# Patient Record
Sex: FEMALE | Race: WHITE | NOT HISPANIC OR LATINO | Employment: OTHER | URBAN - NONMETROPOLITAN AREA
[De-identification: names, ages, dates, MRNs, and addresses within clinical notes are randomized per-mention and may not be internally consistent; named-entity substitution may affect disease eponyms.]

---

## 2017-01-01 ENCOUNTER — APPOINTMENT (OUTPATIENT)
Dept: PREADMISSION TESTING | Facility: HOSPITAL | Age: 72
End: 2017-01-01

## 2017-01-01 ENCOUNTER — OFFICE VISIT (OUTPATIENT)
Dept: ORTHOPEDIC SURGERY | Facility: CLINIC | Age: 72
End: 2017-01-01

## 2017-01-01 ENCOUNTER — OUTSIDE FACILITY SERVICE (OUTPATIENT)
Dept: FAMILY MEDICINE CLINIC | Facility: CLINIC | Age: 72
End: 2017-01-01

## 2017-01-01 ENCOUNTER — ANESTHESIA EVENT (OUTPATIENT)
Dept: PERIOP | Facility: HOSPITAL | Age: 72
End: 2017-01-01

## 2017-01-01 ENCOUNTER — TELEPHONE (OUTPATIENT)
Dept: FAMILY MEDICINE CLINIC | Facility: CLINIC | Age: 72
End: 2017-01-01

## 2017-01-01 ENCOUNTER — HOSPITAL ENCOUNTER (OUTPATIENT)
Facility: HOSPITAL | Age: 72
Setting detail: HOSPITAL OUTPATIENT SURGERY
Discharge: NURSING, REHAB OR LONG-TERM CARE | End: 2017-02-22
Attending: PODIATRIST | Admitting: PODIATRIST

## 2017-01-01 ENCOUNTER — APPOINTMENT (OUTPATIENT)
Dept: GENERAL RADIOLOGY | Facility: HOSPITAL | Age: 72
End: 2017-01-01

## 2017-01-01 ENCOUNTER — HOSPITAL ENCOUNTER (EMERGENCY)
Facility: HOSPITAL | Age: 72
Discharge: NURSING, REHAB OR LONG-TERM CARE | End: 2017-07-26
Attending: EMERGENCY MEDICINE | Admitting: EMERGENCY MEDICINE

## 2017-01-01 ENCOUNTER — TELEPHONE (OUTPATIENT)
Dept: ORTHOPEDIC SURGERY | Facility: CLINIC | Age: 72
End: 2017-01-01

## 2017-01-01 ENCOUNTER — HOSPITAL ENCOUNTER (EMERGENCY)
Facility: HOSPITAL | Age: 72
Discharge: ANOTHER HOSPITAL | End: 2017-11-14
Attending: EMERGENCY MEDICINE | Admitting: EMERGENCY MEDICINE

## 2017-01-01 ENCOUNTER — HOSPITAL ENCOUNTER (EMERGENCY)
Facility: HOSPITAL | Age: 72
Discharge: ANOTHER HOSPITAL | End: 2017-06-27
Attending: EMERGENCY MEDICINE | Admitting: EMERGENCY MEDICINE

## 2017-01-01 ENCOUNTER — LAB REQUISITION (OUTPATIENT)
Dept: LAB | Facility: HOSPITAL | Age: 72
End: 2017-01-01

## 2017-01-01 ENCOUNTER — HOSPITAL ENCOUNTER (OUTPATIENT)
Dept: GENERAL RADIOLOGY | Facility: HOSPITAL | Age: 72
Discharge: HOME | End: 2017-02-20
Admitting: PODIATRIST

## 2017-01-01 ENCOUNTER — HOSPITAL ENCOUNTER (OUTPATIENT)
Facility: HOSPITAL | Age: 72
Setting detail: HOSPITAL OUTPATIENT SURGERY
Discharge: NURSING, REHAB OR LONG-TERM CARE | End: 2017-06-05
Attending: PODIATRIST | Admitting: PODIATRIST

## 2017-01-01 ENCOUNTER — ANESTHESIA (OUTPATIENT)
Dept: PERIOP | Facility: HOSPITAL | Age: 72
End: 2017-01-01

## 2017-01-01 ENCOUNTER — HOSPITAL ENCOUNTER (INPATIENT)
Facility: HOSPITAL | Age: 72
LOS: 1 days | Discharge: ANOTHER HOSPITAL | End: 2017-04-19
Attending: PODIATRIST | Admitting: HOSPITALIST

## 2017-01-01 ENCOUNTER — TRANSCRIBE ORDERS (OUTPATIENT)
Dept: CT IMAGING | Facility: HOSPITAL | Age: 72
End: 2017-01-01

## 2017-01-01 ENCOUNTER — APPOINTMENT (OUTPATIENT)
Dept: CT IMAGING | Facility: HOSPITAL | Age: 72
End: 2017-01-01

## 2017-01-01 ENCOUNTER — HOSPITAL ENCOUNTER (EMERGENCY)
Facility: HOSPITAL | Age: 72
Discharge: ANOTHER HOSPITAL | End: 2017-12-02
Attending: EMERGENCY MEDICINE | Admitting: EMERGENCY MEDICINE

## 2017-01-01 VITALS
BODY MASS INDEX: 33.13 KG/M2 | WEIGHT: 180 LBS | HEIGHT: 62 IN | SYSTOLIC BLOOD PRESSURE: 143 MMHG | DIASTOLIC BLOOD PRESSURE: 59 MMHG

## 2017-01-01 VITALS — WEIGHT: 182.3 LBS | RESPIRATION RATE: 18 BRPM | HEIGHT: 62 IN | BODY MASS INDEX: 33.55 KG/M2

## 2017-01-01 VITALS
DIASTOLIC BLOOD PRESSURE: 58 MMHG | BODY MASS INDEX: 33.75 KG/M2 | HEIGHT: 62 IN | TEMPERATURE: 98.1 F | SYSTOLIC BLOOD PRESSURE: 109 MMHG | WEIGHT: 183.4 LBS | HEART RATE: 97 BPM | OXYGEN SATURATION: 94 % | RESPIRATION RATE: 20 BRPM

## 2017-01-01 VITALS
DIASTOLIC BLOOD PRESSURE: 69 MMHG | SYSTOLIC BLOOD PRESSURE: 125 MMHG | BODY MASS INDEX: 29.63 KG/M2 | HEART RATE: 108 BPM | OXYGEN SATURATION: 92 % | RESPIRATION RATE: 19 BRPM | WEIGHT: 161 LBS | HEIGHT: 62 IN | TEMPERATURE: 98.3 F

## 2017-01-01 VITALS — RESPIRATION RATE: 16 BRPM | HEIGHT: 62 IN | BODY MASS INDEX: 31.83 KG/M2 | WEIGHT: 173 LBS

## 2017-01-01 VITALS
TEMPERATURE: 98.7 F | BODY MASS INDEX: 29.63 KG/M2 | SYSTOLIC BLOOD PRESSURE: 129 MMHG | HEART RATE: 107 BPM | WEIGHT: 161 LBS | DIASTOLIC BLOOD PRESSURE: 65 MMHG | RESPIRATION RATE: 24 BRPM | HEIGHT: 62 IN | OXYGEN SATURATION: 96 %

## 2017-01-01 VITALS
TEMPERATURE: 98.6 F | RESPIRATION RATE: 18 BRPM | SYSTOLIC BLOOD PRESSURE: 134 MMHG | HEIGHT: 64 IN | WEIGHT: 180 LBS | HEART RATE: 105 BPM | BODY MASS INDEX: 30.73 KG/M2 | DIASTOLIC BLOOD PRESSURE: 58 MMHG | OXYGEN SATURATION: 95 %

## 2017-01-01 VITALS — BODY MASS INDEX: 32.43 KG/M2 | WEIGHT: 183 LBS | HEIGHT: 63 IN | RESPIRATION RATE: 16 BRPM

## 2017-01-01 VITALS — HEIGHT: 64 IN | BODY MASS INDEX: 31.07 KG/M2 | RESPIRATION RATE: 18 BRPM | WEIGHT: 182 LBS

## 2017-01-01 VITALS
WEIGHT: 180 LBS | OXYGEN SATURATION: 96 % | HEART RATE: 81 BPM | SYSTOLIC BLOOD PRESSURE: 138 MMHG | HEIGHT: 62 IN | RESPIRATION RATE: 20 BRPM | BODY MASS INDEX: 33.13 KG/M2 | TEMPERATURE: 98.1 F | DIASTOLIC BLOOD PRESSURE: 63 MMHG

## 2017-01-01 VITALS — RESPIRATION RATE: 18 BRPM | WEIGHT: 180 LBS | BODY MASS INDEX: 33.13 KG/M2 | HEIGHT: 62 IN

## 2017-01-01 VITALS — BODY MASS INDEX: 31.83 KG/M2 | WEIGHT: 173 LBS | RESPIRATION RATE: 16 BRPM | HEIGHT: 62 IN

## 2017-01-01 VITALS
HEART RATE: 66 BPM | DIASTOLIC BLOOD PRESSURE: 40 MMHG | RESPIRATION RATE: 17 BRPM | BODY MASS INDEX: 28.35 KG/M2 | WEIGHT: 160 LBS | TEMPERATURE: 98 F | OXYGEN SATURATION: 96 % | SYSTOLIC BLOOD PRESSURE: 90 MMHG | HEIGHT: 63 IN

## 2017-01-01 VITALS — BODY MASS INDEX: 32.42 KG/M2 | WEIGHT: 183 LBS

## 2017-01-01 DIAGNOSIS — S91.009A WOUND OF ANKLE, UNSPECIFIED LATERALITY, INITIAL ENCOUNTER: ICD-10-CM

## 2017-01-01 DIAGNOSIS — R79.89 OTHER SPECIFIED ABNORMAL FINDINGS OF BLOOD CHEMISTRY: ICD-10-CM

## 2017-01-01 DIAGNOSIS — S91.009A: ICD-10-CM

## 2017-01-01 DIAGNOSIS — R60.0 EDEMA OF EXTREMITY OF UNKNOWN CAUSE: ICD-10-CM

## 2017-01-01 DIAGNOSIS — N18.6 ESRD (END STAGE RENAL DISEASE) ON DIALYSIS (HCC): ICD-10-CM

## 2017-01-01 DIAGNOSIS — S91.302A OPEN WOUND OF LEFT FOOT EXCEPT TOES WITH COMPLICATION, INITIAL ENCOUNTER: ICD-10-CM

## 2017-01-01 DIAGNOSIS — I48.91 ATRIAL FIBRILLATION WITH RAPID VENTRICULAR RESPONSE (HCC): Primary | ICD-10-CM

## 2017-01-01 DIAGNOSIS — L97.923 LEG ULCER, LEFT, WITH NECROSIS OF MUSCLE (HCC): ICD-10-CM

## 2017-01-01 DIAGNOSIS — S91.302D WOUND, OPEN, FOOT, LEFT, SUBSEQUENT ENCOUNTER: Primary | ICD-10-CM

## 2017-01-01 DIAGNOSIS — Z99.2 ESRD (END STAGE RENAL DISEASE) ON DIALYSIS (HCC): ICD-10-CM

## 2017-01-01 DIAGNOSIS — S91.302D WOUND, OPEN, FOOT, LEFT, SUBSEQUENT ENCOUNTER: ICD-10-CM

## 2017-01-01 DIAGNOSIS — I48.91 ATRIAL FIBRILLATION, UNSPECIFIED TYPE (HCC): ICD-10-CM

## 2017-01-01 DIAGNOSIS — S91.309A: Primary | ICD-10-CM

## 2017-01-01 DIAGNOSIS — K59.00 CONSTIPATION, UNSPECIFIED CONSTIPATION TYPE: ICD-10-CM

## 2017-01-01 DIAGNOSIS — S91.309A: ICD-10-CM

## 2017-01-01 DIAGNOSIS — Z79.01 CHRONIC ANTICOAGULATION: ICD-10-CM

## 2017-01-01 DIAGNOSIS — L97.524 FOOT ULCER WITH NECROSIS OF BONE, LEFT (HCC): ICD-10-CM

## 2017-01-01 DIAGNOSIS — N30.00 ACUTE CYSTITIS WITHOUT HEMATURIA: Primary | ICD-10-CM

## 2017-01-01 DIAGNOSIS — R79.9 ABNORMAL FINDING OF BLOOD CHEMISTRY: ICD-10-CM

## 2017-01-01 DIAGNOSIS — L97.421: Primary | ICD-10-CM

## 2017-01-01 DIAGNOSIS — K92.1 BLOOD IN STOOL: ICD-10-CM

## 2017-01-01 DIAGNOSIS — E87.79 OTHER HYPERVOLEMIA: ICD-10-CM

## 2017-01-01 DIAGNOSIS — Z00.00 ROUTINE GENERAL MEDICAL EXAMINATION AT A HEALTH CARE FACILITY: ICD-10-CM

## 2017-01-01 DIAGNOSIS — N39.0 URINARY TRACT INFECTION, SITE UNSPECIFIED: Primary | ICD-10-CM

## 2017-01-01 DIAGNOSIS — I21.4 NSTEMI (NON-ST ELEVATED MYOCARDIAL INFARCTION) (HCC): ICD-10-CM

## 2017-01-01 DIAGNOSIS — R20.9 COLD LEFT FOOT: Primary | ICD-10-CM

## 2017-01-01 DIAGNOSIS — S91.009A WOUND OF ANKLE, UNSPECIFIED LATERALITY, INITIAL ENCOUNTER: Primary | ICD-10-CM

## 2017-01-01 DIAGNOSIS — L97.923 LEG ULCER, LEFT, WITH NECROSIS OF MUSCLE (HCC): Primary | ICD-10-CM

## 2017-01-01 DIAGNOSIS — E11.8 TYPE 2 DIABETES MELLITUS WITH COMPLICATION, UNSPECIFIED LONG TERM INSULIN USE STATUS: ICD-10-CM

## 2017-01-01 DIAGNOSIS — I21.4 NSTEMI (NON-ST ELEVATED MYOCARDIAL INFARCTION) (HCC): Primary | ICD-10-CM

## 2017-01-01 DIAGNOSIS — R60.0 EDEMA OF EXTREMITY OF UNKNOWN CAUSE: Primary | ICD-10-CM

## 2017-01-01 LAB
ACINETOBACTER SCREEN CX: NORMAL
ALBUMIN SERPL-MCNC: 2.4 G/DL (ref 3.5–5)
ALBUMIN SERPL-MCNC: 2.5 G/DL (ref 3.5–5)
ALBUMIN SERPL-MCNC: 2.5 G/DL (ref 3.5–5)
ALBUMIN SERPL-MCNC: 2.7 G/DL (ref 3.5–5)
ALBUMIN SERPL-MCNC: 3.3 G/DL (ref 3.5–5)
ALBUMIN SERPL-MCNC: 3.4 G/DL (ref 3.5–5)
ALBUMIN SERPL-MCNC: 3.8 G/DL (ref 3.5–5)
ALBUMIN SERPL-MCNC: 4 G/DL (ref 3.5–5)
ALBUMIN SERPL-MCNC: 4.2 G/DL (ref 3.5–5)
ALBUMIN/GLOB SERPL: 0.7 G/DL (ref 1–2)
ALBUMIN/GLOB SERPL: 0.9 G/DL (ref 1–2)
ALBUMIN/GLOB SERPL: 1 G/DL (ref 1–2)
ALBUMIN/GLOB SERPL: 1.2 G/DL (ref 1–2)
ALP SERPL-CCNC: 112 U/L (ref 38–126)
ALP SERPL-CCNC: 117 U/L (ref 38–126)
ALP SERPL-CCNC: 117 U/L (ref 38–126)
ALP SERPL-CCNC: 124 U/L (ref 38–126)
ALP SERPL-CCNC: 130 U/L (ref 38–126)
ALP SERPL-CCNC: 89 U/L (ref 38–126)
ALP SERPL-CCNC: 89 U/L (ref 38–126)
ALP SERPL-CCNC: 93 U/L (ref 38–126)
ALT SERPL W P-5'-P-CCNC: 150 U/L (ref 13–69)
ALT SERPL W P-5'-P-CCNC: 20 U/L (ref 13–69)
ALT SERPL W P-5'-P-CCNC: 26 U/L (ref 13–69)
ALT SERPL W P-5'-P-CCNC: 30 U/L (ref 13–69)
ALT SERPL W P-5'-P-CCNC: 32 U/L (ref 13–69)
ALT SERPL W P-5'-P-CCNC: 41 U/L (ref 13–69)
ALT SERPL W P-5'-P-CCNC: 53 U/L (ref 13–69)
ALT SERPL W P-5'-P-CCNC: 54 U/L (ref 13–69)
ANION GAP SERPL CALCULATED.3IONS-SCNC: 11.8 MMOL/L
ANION GAP SERPL CALCULATED.3IONS-SCNC: 12.1 MMOL/L
ANION GAP SERPL CALCULATED.3IONS-SCNC: 14.9 MMOL/L
ANION GAP SERPL CALCULATED.3IONS-SCNC: 15.3 MMOL/L
ANION GAP SERPL CALCULATED.3IONS-SCNC: 17.1 MMOL/L
ANION GAP SERPL CALCULATED.3IONS-SCNC: 17.5 MMOL/L
ANION GAP SERPL CALCULATED.3IONS-SCNC: 17.8 MMOL/L
ANION GAP SERPL CALCULATED.3IONS-SCNC: 19.1 MMOL/L
ANION GAP SERPL CALCULATED.3IONS-SCNC: 21 MMOL/L
ANISOCYTOSIS BLD QL: ABNORMAL
ANISOCYTOSIS BLD QL: NORMAL
APTT PPP: 24.8 SECONDS (ref 25–36)
AST SERPL-CCNC: 23 U/L (ref 15–46)
AST SERPL-CCNC: 26 U/L (ref 15–46)
AST SERPL-CCNC: 261 U/L (ref 15–46)
AST SERPL-CCNC: 30 U/L (ref 15–46)
AST SERPL-CCNC: 30 U/L (ref 15–46)
AST SERPL-CCNC: 42 U/L (ref 15–46)
AST SERPL-CCNC: 57 U/L (ref 15–46)
AST SERPL-CCNC: 69 U/L (ref 15–46)
BACTERIA SPEC AEROBE CULT: ABNORMAL
BACTERIA SPEC ANAEROBE CULT: NORMAL
BACTERIA SPEC ANAEROBE CULT: NORMAL
BACTERIA UR QL AUTO: ABNORMAL /HPF
BACTERIA UR QL AUTO: ABNORMAL /HPF
BASOPHILS # BLD AUTO: 0.01 10*3/MM3 (ref 0–0.2)
BASOPHILS # BLD AUTO: 0.02 10*3/MM3 (ref 0–0.2)
BASOPHILS # BLD AUTO: 0.03 10*3/MM3 (ref 0–0.2)
BASOPHILS # BLD AUTO: 0.04 10*3/MM3 (ref 0–0.2)
BASOPHILS # BLD AUTO: 0.06 10*3/MM3 (ref 0–0.2)
BASOPHILS NFR BLD AUTO: 0.1 % (ref 0–2.5)
BASOPHILS NFR BLD AUTO: 0.1 % (ref 0–2.5)
BASOPHILS NFR BLD AUTO: 0.2 % (ref 0–2.5)
BASOPHILS NFR BLD AUTO: 0.2 % (ref 0–2.5)
BASOPHILS NFR BLD AUTO: 0.3 % (ref 0–2.5)
BASOPHILS NFR BLD AUTO: 0.3 % (ref 0–2.5)
BASOPHILS NFR BLD AUTO: 0.6 % (ref 0–2.5)
BILIRUB SERPL-MCNC: 0.4 MG/DL (ref 0.2–1.3)
BILIRUB SERPL-MCNC: 0.5 MG/DL (ref 0.2–1.3)
BILIRUB SERPL-MCNC: 0.6 MG/DL (ref 0.2–1.3)
BILIRUB UR QL STRIP: ABNORMAL
BILIRUB UR QL STRIP: NEGATIVE
BUN BLD-MCNC: 20 MG/DL (ref 7–20)
BUN BLD-MCNC: 22 MG/DL (ref 7–20)
BUN BLD-MCNC: 26 MG/DL (ref 7–20)
BUN BLD-MCNC: 28 MG/DL (ref 7–20)
BUN BLD-MCNC: 29 MG/DL (ref 7–20)
BUN BLD-MCNC: 43 MG/DL (ref 7–20)
BUN BLD-MCNC: 47 MG/DL (ref 7–20)
BUN BLD-MCNC: 64 MG/DL (ref 7–20)
BUN BLD-MCNC: 80 MG/DL (ref 7–20)
BUN/CREAT SERPL: 10.2 (ref 7.1–23.5)
BUN/CREAT SERPL: 11.8 (ref 7.1–23.5)
BUN/CREAT SERPL: 14.9 (ref 7.1–23.5)
BUN/CREAT SERPL: 16.5 (ref 7.1–23.5)
BUN/CREAT SERPL: 32 (ref 7.1–23.5)
BUN/CREAT SERPL: 7.6 (ref 7.1–23.5)
BUN/CREAT SERPL: 7.7 (ref 7.1–23.5)
BUN/CREAT SERPL: 8.1 (ref 7.1–23.5)
BUN/CREAT SERPL: 8.5 (ref 7.1–23.5)
CALCIUM SPEC-SCNC: 7.2 MG/DL (ref 8.4–10.2)
CALCIUM SPEC-SCNC: 7.3 MG/DL (ref 8.4–10.2)
CALCIUM SPEC-SCNC: 7.6 MG/DL (ref 8.4–10.2)
CALCIUM SPEC-SCNC: 7.9 MG/DL (ref 8.4–10.2)
CALCIUM SPEC-SCNC: 8.3 MG/DL (ref 8.4–10.2)
CALCIUM SPEC-SCNC: 8.6 MG/DL (ref 8.4–10.2)
CALCIUM SPEC-SCNC: 8.7 MG/DL (ref 8.4–10.2)
CALCIUM SPEC-SCNC: 8.8 MG/DL (ref 8.4–10.2)
CALCIUM SPEC-SCNC: 9.4 MG/DL (ref 8.4–10.2)
CHLORIDE SERPL-SCNC: 100 MMOL/L (ref 98–107)
CHLORIDE SERPL-SCNC: 100 MMOL/L (ref 98–107)
CHLORIDE SERPL-SCNC: 105 MMOL/L (ref 98–107)
CHLORIDE SERPL-SCNC: 106 MMOL/L (ref 98–107)
CHLORIDE SERPL-SCNC: 108 MMOL/L (ref 98–107)
CHLORIDE SERPL-SCNC: 96 MMOL/L (ref 98–107)
CHLORIDE SERPL-SCNC: 97 MMOL/L (ref 98–107)
CHLORIDE SERPL-SCNC: 97 MMOL/L (ref 98–107)
CHLORIDE SERPL-SCNC: 98 MMOL/L (ref 98–107)
CHOLEST SERPL-MCNC: 80 MG/DL (ref 0–199)
CLARITY UR: ABNORMAL
CLARITY UR: ABNORMAL
CO2 SERPL-SCNC: 20 MMOL/L (ref 26–30)
CO2 SERPL-SCNC: 24 MMOL/L (ref 26–30)
CO2 SERPL-SCNC: 26 MMOL/L (ref 26–30)
CO2 SERPL-SCNC: 27 MMOL/L (ref 26–30)
CO2 SERPL-SCNC: 28 MMOL/L (ref 26–30)
CO2 SERPL-SCNC: 28 MMOL/L (ref 26–30)
CO2 SERPL-SCNC: 32 MMOL/L (ref 26–30)
COLOR UR: YELLOW
COLOR UR: YELLOW
CREAT BLD-MCNC: 2.2 MG/DL (ref 0.6–1.3)
CREAT BLD-MCNC: 2.5 MG/DL (ref 0.6–1.3)
CREAT BLD-MCNC: 2.6 MG/DL (ref 0.6–1.3)
CREAT BLD-MCNC: 2.6 MG/DL (ref 0.6–1.3)
CREAT BLD-MCNC: 2.9 MG/DL (ref 0.6–1.3)
CREAT BLD-MCNC: 3.3 MG/DL (ref 0.6–1.3)
CREAT BLD-MCNC: 3.6 MG/DL (ref 0.6–1.3)
CREAT BLD-MCNC: 4.3 MG/DL (ref 0.6–1.3)
CREAT BLD-MCNC: 4.6 MG/DL (ref 0.6–1.3)
CRP SERPL-MCNC: 6.9 MG/DL (ref 0–1)
DACRYOCYTES BLD QL SMEAR: NORMAL
DEPRECATED RDW RBC AUTO: 51.9 FL (ref 37–54)
DEPRECATED RDW RBC AUTO: 57.1 FL (ref 37–54)
DEPRECATED RDW RBC AUTO: 59.8 FL (ref 37–54)
DEPRECATED RDW RBC AUTO: 62.3 FL (ref 37–54)
DEPRECATED RDW RBC AUTO: 67.3 FL (ref 37–54)
DEPRECATED RDW RBC AUTO: 69.3 FL (ref 37–54)
DEPRECATED RDW RBC AUTO: 70.6 FL (ref 37–54)
DEPRECATED RDW RBC AUTO: 71.1 FL (ref 37–54)
ELLIPTOCYTES BLD QL SMEAR: NORMAL
EOSINOPHIL # BLD AUTO: 0.03 10*3/MM3 (ref 0–0.7)
EOSINOPHIL # BLD AUTO: 0.08 10*3/MM3 (ref 0–0.7)
EOSINOPHIL # BLD AUTO: 0.12 10*3/MM3 (ref 0–0.7)
EOSINOPHIL # BLD AUTO: 0.12 10*3/MM3 (ref 0–0.7)
EOSINOPHIL # BLD AUTO: 0.14 10*3/MM3 (ref 0–0.7)
EOSINOPHIL # BLD AUTO: 0.19 10*3/MM3 (ref 0–0.7)
EOSINOPHIL # BLD AUTO: 0.27 10*3/MM3 (ref 0–0.7)
EOSINOPHIL NFR BLD AUTO: 0.2 % (ref 0–7)
EOSINOPHIL NFR BLD AUTO: 0.7 % (ref 0–7)
EOSINOPHIL NFR BLD AUTO: 1 % (ref 0–7)
EOSINOPHIL NFR BLD AUTO: 1 % (ref 0–7)
EOSINOPHIL NFR BLD AUTO: 1.5 % (ref 0–7)
EOSINOPHIL NFR BLD AUTO: 1.5 % (ref 0–7)
EOSINOPHIL NFR BLD AUTO: 2.8 % (ref 0–7)
ERYTHROCYTE [DISTWIDTH] IN BLOOD BY AUTOMATED COUNT: 14.6 % (ref 11.5–14.5)
ERYTHROCYTE [DISTWIDTH] IN BLOOD BY AUTOMATED COUNT: 16.9 % (ref 11.5–14.5)
ERYTHROCYTE [DISTWIDTH] IN BLOOD BY AUTOMATED COUNT: 17 % (ref 11.5–14.5)
ERYTHROCYTE [DISTWIDTH] IN BLOOD BY AUTOMATED COUNT: 17.3 % (ref 11.5–14.5)
ERYTHROCYTE [DISTWIDTH] IN BLOOD BY AUTOMATED COUNT: 19.4 % (ref 11.5–14.5)
ERYTHROCYTE [DISTWIDTH] IN BLOOD BY AUTOMATED COUNT: 19.6 % (ref 11.5–14.5)
ERYTHROCYTE [DISTWIDTH] IN BLOOD BY AUTOMATED COUNT: 19.7 % (ref 11.5–14.5)
ERYTHROCYTE [DISTWIDTH] IN BLOOD BY AUTOMATED COUNT: 19.9 % (ref 11.5–14.5)
ERYTHROCYTE [SEDIMENTATION RATE] IN BLOOD: 27 MM/HR (ref 0–20)
GFR SERPL CREATININE-BSD FRML MDRD: 10 ML/MIN/1.73
GFR SERPL CREATININE-BSD FRML MDRD: 13 ML/MIN/1.73
GFR SERPL CREATININE-BSD FRML MDRD: 14 ML/MIN/1.73
GFR SERPL CREATININE-BSD FRML MDRD: 16 ML/MIN/1.73
GFR SERPL CREATININE-BSD FRML MDRD: 18 ML/MIN/1.73
GFR SERPL CREATININE-BSD FRML MDRD: 18 ML/MIN/1.73
GFR SERPL CREATININE-BSD FRML MDRD: 19 ML/MIN/1.73
GFR SERPL CREATININE-BSD FRML MDRD: 22 ML/MIN/1.73
GFR SERPL CREATININE-BSD FRML MDRD: 9 ML/MIN/1.73
GLOBULIN UR ELPH-MCNC: 3.4 GM/DL
GLOBULIN UR ELPH-MCNC: 3.5 GM/DL
GLOBULIN UR ELPH-MCNC: 3.6 GM/DL
GLOBULIN UR ELPH-MCNC: 3.7 GM/DL
GLOBULIN UR ELPH-MCNC: 3.8 GM/DL
GLOBULIN UR ELPH-MCNC: 3.8 GM/DL
GLOBULIN UR ELPH-MCNC: 3.9 GM/DL
GLOBULIN UR ELPH-MCNC: 4.5 GM/DL
GLUCOSE BLD-MCNC: 111 MG/DL (ref 74–98)
GLUCOSE BLD-MCNC: 115 MG/DL (ref 74–98)
GLUCOSE BLD-MCNC: 177 MG/DL (ref 74–98)
GLUCOSE BLD-MCNC: 191 MG/DL (ref 74–98)
GLUCOSE BLD-MCNC: 213 MG/DL (ref 74–98)
GLUCOSE BLD-MCNC: 245 MG/DL (ref 74–98)
GLUCOSE BLD-MCNC: 349 MG/DL (ref 74–98)
GLUCOSE BLD-MCNC: 543 MG/DL (ref 74–98)
GLUCOSE BLD-MCNC: 550 MG/DL (ref 74–98)
GLUCOSE BLDC GLUCOMTR-MCNC: 105 MG/DL (ref 70–130)
GLUCOSE BLDC GLUCOMTR-MCNC: 111 MG/DL (ref 70–130)
GLUCOSE BLDC GLUCOMTR-MCNC: 131 MG/DL (ref 70–130)
GLUCOSE BLDC GLUCOMTR-MCNC: 153 MG/DL (ref 70–130)
GLUCOSE BLDC GLUCOMTR-MCNC: 159 MG/DL (ref 70–130)
GLUCOSE BLDC GLUCOMTR-MCNC: 191 MG/DL (ref 70–130)
GLUCOSE BLDC GLUCOMTR-MCNC: 201 MG/DL (ref 70–130)
GLUCOSE BLDC GLUCOMTR-MCNC: 333 MG/DL (ref 70–130)
GLUCOSE BLDC GLUCOMTR-MCNC: 339 MG/DL (ref 70–130)
GLUCOSE BLDC GLUCOMTR-MCNC: 361 MG/DL (ref 70–130)
GLUCOSE BLDC GLUCOMTR-MCNC: 425 MG/DL (ref 70–130)
GLUCOSE BLDC GLUCOMTR-MCNC: 442 MG/DL (ref 70–130)
GLUCOSE BLDC GLUCOMTR-MCNC: 473 MG/DL (ref 70–130)
GLUCOSE BLDC GLUCOMTR-MCNC: 473 MG/DL (ref 70–130)
GLUCOSE BLDC GLUCOMTR-MCNC: 483 MG/DL (ref 70–130)
GLUCOSE BLDC GLUCOMTR-MCNC: 59 MG/DL (ref 70–130)
GLUCOSE UR STRIP-MCNC: NEGATIVE MG/DL
GLUCOSE UR STRIP-MCNC: NEGATIVE MG/DL
GRAM STN SPEC: NORMAL
HCT VFR BLD AUTO: 30 % (ref 37–47)
HCT VFR BLD AUTO: 32.1 % (ref 37–47)
HCT VFR BLD AUTO: 33 % (ref 37–47)
HCT VFR BLD AUTO: 33.1 % (ref 37–47)
HCT VFR BLD AUTO: 34.6 % (ref 37–47)
HCT VFR BLD AUTO: 36.2 % (ref 37–47)
HCT VFR BLD AUTO: 38.5 % (ref 37–47)
HCT VFR BLD AUTO: 41 % (ref 37–47)
HDLC SERPL-MCNC: 24 MG/DL (ref 40–60)
HGB BLD-MCNC: 10 G/DL (ref 12–16)
HGB BLD-MCNC: 10 G/DL (ref 12–16)
HGB BLD-MCNC: 10.6 G/DL (ref 12–16)
HGB BLD-MCNC: 11.2 G/DL (ref 12–16)
HGB BLD-MCNC: 11.9 G/DL (ref 12–16)
HGB BLD-MCNC: 8.5 G/DL (ref 12–16)
HGB BLD-MCNC: 9.5 G/DL (ref 12–16)
HGB BLD-MCNC: 9.5 G/DL (ref 12–16)
HGB UR QL STRIP.AUTO: ABNORMAL
HGB UR QL STRIP.AUTO: ABNORMAL
HOLD SPECIMEN: NORMAL
HOLD SPECIMEN: NORMAL
HYALINE CASTS UR QL AUTO: ABNORMAL /LPF
HYALINE CASTS UR QL AUTO: ABNORMAL /LPF
HYPOCHROMIA BLD QL: ABNORMAL
HYPOCHROMIA BLD QL: NORMAL
HYPOCHROMIA BLD QL: NORMAL
IMM GRANULOCYTES # BLD: 0.03 10*3/MM3 (ref 0–0.06)
IMM GRANULOCYTES # BLD: 0.04 10*3/MM3 (ref 0–0.06)
IMM GRANULOCYTES # BLD: 0.04 10*3/MM3 (ref 0–0.06)
IMM GRANULOCYTES # BLD: 0.05 10*3/MM3 (ref 0–0.06)
IMM GRANULOCYTES # BLD: 0.07 10*3/MM3 (ref 0–0.06)
IMM GRANULOCYTES # BLD: 0.17 10*3/MM3 (ref 0–0.06)
IMM GRANULOCYTES # BLD: 0.17 10*3/MM3 (ref 0–0.06)
IMM GRANULOCYTES NFR BLD: 0.3 % (ref 0–0.6)
IMM GRANULOCYTES NFR BLD: 0.3 % (ref 0–0.6)
IMM GRANULOCYTES NFR BLD: 0.4 % (ref 0–0.6)
IMM GRANULOCYTES NFR BLD: 0.5 % (ref 0–0.6)
IMM GRANULOCYTES NFR BLD: 0.5 % (ref 0–0.6)
IMM GRANULOCYTES NFR BLD: 1.2 % (ref 0–0.6)
IMM GRANULOCYTES NFR BLD: 1.3 % (ref 0–0.6)
INR PPP: 1.22
INR PPP: 1.32 (ref 0.9–1.1)
INR PPP: 1.63 (ref 0.9–1.1)
INR PPP: 2.67
INR PPP: 2.97 (ref 0.9–1.1)
INR PPP: 3.12 (ref 0.9–1.1)
INR PPP: 3.18 (ref 0.9–1.1)
INR PPP: 3.22 (ref 0.9–1.1)
INR PPP: 3.43 (ref 0.9–1.1)
INR PPP: 4.02 (ref 0.9–1.1)
KETONES UR QL STRIP: ABNORMAL
KETONES UR QL STRIP: ABNORMAL
LDLC SERPL CALC-MCNC: 34 MG/DL (ref 0–99)
LDLC/HDLC SERPL: 1.43 {RATIO}
LEUKOCYTE ESTERASE UR QL STRIP.AUTO: ABNORMAL
LEUKOCYTE ESTERASE UR QL STRIP.AUTO: ABNORMAL
LYMPHOCYTES # BLD AUTO: 0.84 10*3/MM3 (ref 0.6–3.4)
LYMPHOCYTES # BLD AUTO: 1.12 10*3/MM3 (ref 0.6–3.4)
LYMPHOCYTES # BLD AUTO: 1.24 10*3/MM3 (ref 0.6–3.4)
LYMPHOCYTES # BLD AUTO: 1.26 10*3/MM3 (ref 0.6–3.4)
LYMPHOCYTES # BLD AUTO: 1.29 10*3/MM3 (ref 0.6–3.4)
LYMPHOCYTES # BLD AUTO: 1.32 10*3/MM3 (ref 0.6–3.4)
LYMPHOCYTES # BLD AUTO: 1.71 10*3/MM3 (ref 0.6–3.4)
LYMPHOCYTES # BLD MANUAL: 1.11 10*3/MM3 (ref 0.6–3.4)
LYMPHOCYTES NFR BLD AUTO: 10.8 % (ref 10–50)
LYMPHOCYTES NFR BLD AUTO: 15.5 % (ref 10–50)
LYMPHOCYTES NFR BLD AUTO: 17.5 % (ref 10–50)
LYMPHOCYTES NFR BLD AUTO: 7 % (ref 10–50)
LYMPHOCYTES NFR BLD AUTO: 8.1 % (ref 10–50)
LYMPHOCYTES NFR BLD AUTO: 8.8 % (ref 10–50)
LYMPHOCYTES NFR BLD AUTO: 9 % (ref 10–50)
LYMPHOCYTES NFR BLD MANUAL: 10 % (ref 10–50)
LYMPHOCYTES NFR BLD MANUAL: 6 % (ref 0–12)
MACROCYTES BLD QL SMEAR: ABNORMAL
MAGNESIUM SERPL-MCNC: 2.1 MG/DL (ref 1.6–2.3)
MAGNESIUM SERPL-MCNC: 2.2 MG/DL (ref 1.6–2.3)
MCH RBC QN AUTO: 26.7 PG (ref 27–31)
MCH RBC QN AUTO: 27 PG (ref 27–31)
MCH RBC QN AUTO: 28.1 PG (ref 27–31)
MCH RBC QN AUTO: 28.6 PG (ref 27–31)
MCH RBC QN AUTO: 28.9 PG (ref 27–31)
MCH RBC QN AUTO: 29 PG (ref 27–31)
MCH RBC QN AUTO: 29 PG (ref 27–31)
MCH RBC QN AUTO: 29.5 PG (ref 27–31)
MCHC RBC AUTO-ENTMCNC: 28.3 G/DL (ref 30–37)
MCHC RBC AUTO-ENTMCNC: 28.8 G/DL (ref 30–37)
MCHC RBC AUTO-ENTMCNC: 28.9 G/DL (ref 30–37)
MCHC RBC AUTO-ENTMCNC: 29 G/DL (ref 30–37)
MCHC RBC AUTO-ENTMCNC: 29.1 G/DL (ref 30–37)
MCHC RBC AUTO-ENTMCNC: 29.3 G/DL (ref 30–37)
MCHC RBC AUTO-ENTMCNC: 29.6 G/DL (ref 30–37)
MCHC RBC AUTO-ENTMCNC: 30.2 G/DL (ref 30–37)
MCV RBC AUTO: 100 FL (ref 81–99)
MCV RBC AUTO: 100.6 FL (ref 81–99)
MCV RBC AUTO: 91.9 FL (ref 81–99)
MCV RBC AUTO: 95.2 FL (ref 81–99)
MCV RBC AUTO: 96.7 FL (ref 81–99)
MCV RBC AUTO: 97.6 FL (ref 81–99)
MCV RBC AUTO: 97.6 FL (ref 81–99)
MCV RBC AUTO: 97.9 FL (ref 81–99)
MICROCYTES BLD QL: NORMAL
MONOCYTES # BLD AUTO: 0.48 10*3/MM3 (ref 0–0.9)
MONOCYTES # BLD AUTO: 0.67 10*3/MM3 (ref 0–0.9)
MONOCYTES # BLD AUTO: 0.75 10*3/MM3 (ref 0–0.9)
MONOCYTES # BLD AUTO: 0.79 10*3/MM3 (ref 0–0.9)
MONOCYTES # BLD AUTO: 0.82 10*3/MM3 (ref 0–0.9)
MONOCYTES # BLD AUTO: 0.83 10*3/MM3 (ref 0–0.9)
MONOCYTES # BLD AUTO: 0.9 10*3/MM3 (ref 0–0.9)
MONOCYTES # BLD AUTO: 0.94 10*3/MM3 (ref 0–0.9)
MONOCYTES NFR BLD AUTO: 5.7 % (ref 0–12)
MONOCYTES NFR BLD AUTO: 5.9 % (ref 0–12)
MONOCYTES NFR BLD AUTO: 6.6 % (ref 0–12)
MONOCYTES NFR BLD AUTO: 7.9 % (ref 0–12)
MONOCYTES NFR BLD AUTO: 8.4 % (ref 0–12)
MRSA SPEC QL CULT: NORMAL
NEUTROPHILS # BLD AUTO: 10.13 10*3/MM3 (ref 2–6.9)
NEUTROPHILS # BLD AUTO: 10.41 10*3/MM3 (ref 2–6.9)
NEUTROPHILS # BLD AUTO: 12.17 10*3/MM3 (ref 2–6.9)
NEUTROPHILS # BLD AUTO: 13.06 10*3/MM3 (ref 2–6.9)
NEUTROPHILS # BLD AUTO: 6.22 10*3/MM3 (ref 2–6.9)
NEUTROPHILS # BLD AUTO: 6.88 10*3/MM3 (ref 2–6.9)
NEUTROPHILS # BLD AUTO: 9.32 10*3/MM3 (ref 2–6.9)
NEUTROPHILS # BLD AUTO: 9.57 10*3/MM3 (ref 2–6.9)
NEUTROPHILS NFR BLD AUTO: 70.2 % (ref 37–80)
NEUTROPHILS NFR BLD AUTO: 76.6 % (ref 37–80)
NEUTROPHILS NFR BLD AUTO: 80.1 % (ref 37–80)
NEUTROPHILS NFR BLD AUTO: 82.3 % (ref 37–80)
NEUTROPHILS NFR BLD AUTO: 82.8 % (ref 37–80)
NEUTROPHILS NFR BLD AUTO: 84.8 % (ref 37–80)
NEUTROPHILS NFR BLD AUTO: 85.2 % (ref 37–80)
NEUTROPHILS NFR BLD MANUAL: 69 % (ref 37–80)
NEUTS BAND NFR BLD MANUAL: 15 % (ref 0–6)
NITRITE UR QL STRIP: NEGATIVE
NITRITE UR QL STRIP: NEGATIVE
NRBC BLD MANUAL-RTO: 0 /100 WBC (ref 0–0)
NRBC BLD MANUAL-RTO: 0.2 /100 WBC (ref 0–0)
NRBC BLD MANUAL-RTO: 0.3 /100 WBC (ref 0–0)
NRBC BLD MANUAL-RTO: 0.9 /100 WBC (ref 0–0)
NRBC SPEC MANUAL: 2 /100 WBC (ref 0–0)
NT-PROBNP SERPL-MCNC: ABNORMAL PG/ML (ref 0–125)
PH UR STRIP.AUTO: <=5 [PH] (ref 5–8)
PH UR STRIP.AUTO: <=5 [PH] (ref 5–8)
PHOSPHATE SERPL-MCNC: 5.3 MG/DL (ref 2.5–4.5)
PLAT MORPH BLD: NORMAL
PLATELET # BLD AUTO: 120 10*3/MM3 (ref 130–400)
PLATELET # BLD AUTO: 124 10*3/MM3 (ref 130–400)
PLATELET # BLD AUTO: 159 10*3/MM3 (ref 130–400)
PLATELET # BLD AUTO: 171 10*3/MM3 (ref 130–400)
PLATELET # BLD AUTO: 182 10*3/MM3 (ref 130–400)
PLATELET # BLD AUTO: 196 10*3/MM3 (ref 130–400)
PLATELET # BLD AUTO: 202 10*3/MM3 (ref 130–400)
PLATELET # BLD AUTO: 231 10*3/MM3 (ref 130–400)
PMV BLD AUTO: 11.5 FL (ref 6–12)
PMV BLD AUTO: 11.6 FL (ref 6–12)
PMV BLD AUTO: 11.6 FL (ref 6–12)
PMV BLD AUTO: 11.7 FL (ref 6–12)
PMV BLD AUTO: 12.4 FL (ref 6–12)
PMV BLD AUTO: 12.6 FL (ref 6–12)
PMV BLD AUTO: 13.3 FL (ref 6–12)
PMV BLD AUTO: 13.7 FL (ref 6–12)
POIKILOCYTOSIS BLD QL SMEAR: ABNORMAL
POIKILOCYTOSIS BLD QL SMEAR: NORMAL
POIKILOCYTOSIS BLD QL SMEAR: NORMAL
POLYCHROMASIA BLD QL SMEAR: ABNORMAL
POLYCHROMASIA BLD QL SMEAR: NORMAL
POLYCHROMASIA BLD QL SMEAR: NORMAL
POTASSIUM BLD-SCNC: 3.8 MMOL/L (ref 3.5–5.1)
POTASSIUM BLD-SCNC: 3.9 MMOL/L (ref 3.5–5.1)
POTASSIUM BLD-SCNC: 4.1 MMOL/L (ref 3.5–5.1)
POTASSIUM BLD-SCNC: 4.1 MMOL/L (ref 3.5–5.1)
POTASSIUM BLD-SCNC: 4.3 MMOL/L (ref 3.5–5.1)
POTASSIUM BLD-SCNC: 4.5 MMOL/L (ref 3.5–5.1)
POTASSIUM BLD-SCNC: 4.8 MMOL/L (ref 3.5–5.1)
POTASSIUM BLD-SCNC: 5.1 MMOL/L (ref 3.5–5.1)
POTASSIUM BLD-SCNC: 6 MMOL/L (ref 3.5–5.1)
PROT SERPL-MCNC: 6.1 G/DL (ref 6.3–8.2)
PROT SERPL-MCNC: 6.3 G/DL (ref 6.3–8.2)
PROT SERPL-MCNC: 6.4 G/DL (ref 6.3–8.2)
PROT SERPL-MCNC: 6.8 G/DL (ref 6.3–8.2)
PROT SERPL-MCNC: 7.3 G/DL (ref 6.3–8.2)
PROT SERPL-MCNC: 7.4 G/DL (ref 6.3–8.2)
PROT SERPL-MCNC: 7.6 G/DL (ref 6.3–8.2)
PROT SERPL-MCNC: 8.7 G/DL (ref 6.3–8.2)
PROT UR QL STRIP: ABNORMAL
PROT UR QL STRIP: ABNORMAL
PROTHROMBIN TIME: 13.4 SECONDS (ref 9.6–11.5)
PROTHROMBIN TIME: 14.5 SECONDS (ref 9.3–12.1)
PROTHROMBIN TIME: 17.9 SECONDS (ref 9.3–12.1)
PROTHROMBIN TIME: 30 SECONDS (ref 9.6–11.5)
PROTHROMBIN TIME: 32.6 SECONDS (ref 9.3–12.1)
PROTHROMBIN TIME: 34.2 SECONDS (ref 9.3–12.1)
PROTHROMBIN TIME: 35.3 SECONDS (ref 9.3–12.1)
PROTHROMBIN TIME: 36.1 SECONDS (ref 9.3–12.1)
PROTHROMBIN TIME: 37.6 SECONDS (ref 9.3–12.1)
PROTHROMBIN TIME: 44.1 SECONDS (ref 9.3–12.1)
RBC # BLD AUTO: 3.15 10*6/MM3 (ref 4.2–5.4)
RBC # BLD AUTO: 3.28 10*6/MM3 (ref 4.2–5.4)
RBC # BLD AUTO: 3.28 10*6/MM3 (ref 4.2–5.4)
RBC # BLD AUTO: 3.39 10*6/MM3 (ref 4.2–5.4)
RBC # BLD AUTO: 3.46 10*6/MM3 (ref 4.2–5.4)
RBC # BLD AUTO: 3.71 10*6/MM3 (ref 4.2–5.4)
RBC # BLD AUTO: 3.98 10*6/MM3 (ref 4.2–5.4)
RBC # BLD AUTO: 4.46 10*6/MM3 (ref 4.2–5.4)
RBC # UR: ABNORMAL /HPF
RBC # UR: ABNORMAL /HPF
RBC MORPH BLD: NORMAL
RBC MORPH BLD: NORMAL
REF LAB TEST METHOD: ABNORMAL
REF LAB TEST METHOD: ABNORMAL
SMALL PLATELETS BLD QL SMEAR: ADEQUATE
SODIUM BLD-SCNC: 134 MMOL/L (ref 137–145)
SODIUM BLD-SCNC: 135 MMOL/L (ref 137–145)
SODIUM BLD-SCNC: 136 MMOL/L (ref 137–145)
SODIUM BLD-SCNC: 137 MMOL/L (ref 137–145)
SODIUM BLD-SCNC: 139 MMOL/L (ref 137–145)
SODIUM BLD-SCNC: 139 MMOL/L (ref 137–145)
SODIUM BLD-SCNC: 140 MMOL/L (ref 137–145)
SODIUM BLD-SCNC: 141 MMOL/L (ref 137–145)
SODIUM BLD-SCNC: 145 MMOL/L (ref 137–145)
SP GR UR STRIP: 1.02 (ref 1–1.03)
SP GR UR STRIP: >=1.03 (ref 1–1.03)
SQUAMOUS #/AREA URNS HPF: ABNORMAL /HPF
SQUAMOUS #/AREA URNS HPF: ABNORMAL /HPF
TRIGL SERPL-MCNC: 108 MG/DL
TROPONIN I SERPL-MCNC: 0.03 NG/ML (ref 0–0.03)
TROPONIN I SERPL-MCNC: 0.04 NG/ML (ref 0–0.05)
TROPONIN I SERPL-MCNC: 0.13 NG/ML (ref 0–0.03)
TROPONIN I SERPL-MCNC: 5.98 NG/ML (ref 0–0.03)
TROPONIN I SERPL-MCNC: 7.31 NG/ML (ref 0–0.03)
TROPONIN I SERPL-MCNC: 7.55 NG/ML (ref 0–0.03)
TROPONIN I SERPL-MCNC: 8.26 NG/ML (ref 0–0.03)
TSH SERPL DL<=0.05 MIU/L-ACNC: 2.88 MIU/ML (ref 0.47–4.68)
UROBILINOGEN UR QL STRIP: ABNORMAL
UROBILINOGEN UR QL STRIP: ABNORMAL
VLDLC SERPL-MCNC: 21.6 MG/DL
VRE SPEC QL CULT: NORMAL
WBC MORPH BLD: NORMAL
WBC NRBC COR # BLD: 11.09 10*3/MM3 (ref 4.8–10.8)
WBC NRBC COR # BLD: 11.94 10*3/MM3 (ref 4.8–10.8)
WBC NRBC COR # BLD: 11.95 10*3/MM3 (ref 4.8–10.8)
WBC NRBC COR # BLD: 12.66 10*3/MM3 (ref 4.8–10.8)
WBC NRBC COR # BLD: 14.67 10*3/MM3 (ref 4.8–10.8)
WBC NRBC COR # BLD: 15.32 10*3/MM3 (ref 4.8–10.8)
WBC NRBC COR # BLD: 8.12 10*3/MM3 (ref 4.8–10.8)
WBC NRBC COR # BLD: 9.79 10*3/MM3 (ref 4.8–10.8)
WBC UR QL AUTO: ABNORMAL /HPF
WBC UR QL AUTO: ABNORMAL /HPF
WHOLE BLOOD HOLD SPECIMEN: NORMAL
WHOLE BLOOD HOLD SPECIMEN: NORMAL
YEAST URNS QL MICRO: ABNORMAL /HPF

## 2017-01-01 PROCEDURE — 11045 DBRDMT SUBQ TISS EACH ADDL: CPT | Performed by: PODIATRIST

## 2017-01-01 PROCEDURE — 87147 CULTURE TYPE IMMUNOLOGIC: CPT

## 2017-01-01 PROCEDURE — 99233 SBSQ HOSP IP/OBS HIGH 50: CPT | Performed by: PODIATRIST

## 2017-01-01 PROCEDURE — 36591 DRAW BLOOD OFF VENOUS DEVICE: CPT

## 2017-01-01 PROCEDURE — 96365 THER/PROPH/DIAG IV INF INIT: CPT

## 2017-01-01 PROCEDURE — 99213 OFFICE O/P EST LOW 20 MIN: CPT | Performed by: PODIATRIST

## 2017-01-01 PROCEDURE — 99309 SBSQ NF CARE MODERATE MDM 30: CPT | Performed by: INTERNAL MEDICINE

## 2017-01-01 PROCEDURE — 80061 LIPID PANEL: CPT | Performed by: INTERNAL MEDICINE

## 2017-01-01 PROCEDURE — 82962 GLUCOSE BLOOD TEST: CPT

## 2017-01-01 PROCEDURE — 87086 URINE CULTURE/COLONY COUNT: CPT | Performed by: EMERGENCY MEDICINE

## 2017-01-01 PROCEDURE — 99239 HOSP IP/OBS DSCHRG MGMT >30: CPT | Performed by: HOSPITALIST

## 2017-01-01 PROCEDURE — C1894 INTRO/SHEATH, NON-LASER: HCPCS | Performed by: INTERNAL MEDICINE

## 2017-01-01 PROCEDURE — 85610 PROTHROMBIN TIME: CPT | Performed by: HOSPITALIST

## 2017-01-01 PROCEDURE — G0378 HOSPITAL OBSERVATION PER HR: HCPCS

## 2017-01-01 PROCEDURE — 85027 COMPLETE CBC AUTOMATED: CPT | Performed by: HOSPITALIST

## 2017-01-01 PROCEDURE — 80053 COMPREHEN METABOLIC PANEL: CPT | Performed by: PODIATRIST

## 2017-01-01 PROCEDURE — 99308 SBSQ NF CARE LOW MDM 20: CPT | Performed by: NURSE PRACTITIONER

## 2017-01-01 PROCEDURE — 99307 SBSQ NF CARE SF MDM 10: CPT | Performed by: INTERNAL MEDICINE

## 2017-01-01 PROCEDURE — 85025 COMPLETE CBC W/AUTO DIFF WBC: CPT | Performed by: PHYSICIAN ASSISTANT

## 2017-01-01 PROCEDURE — 4A023N7 MEASUREMENT OF CARDIAC SAMPLING AND PRESSURE, LEFT HEART, PERCUTANEOUS APPROACH: ICD-10-PCS | Performed by: INTERNAL MEDICINE

## 2017-01-01 PROCEDURE — 87077 CULTURE AEROBIC IDENTIFY: CPT | Performed by: PODIATRIST

## 2017-01-01 PROCEDURE — 93005 ELECTROCARDIOGRAM TRACING: CPT | Performed by: PODIATRIST

## 2017-01-01 PROCEDURE — C1769 GUIDE WIRE: HCPCS | Performed by: INTERNAL MEDICINE

## 2017-01-01 PROCEDURE — 25010000002 HEPARIN (PORCINE) PER 1000 UNITS: Performed by: INTERNAL MEDICINE

## 2017-01-01 PROCEDURE — 85025 COMPLETE CBC W/AUTO DIFF WBC: CPT | Performed by: EMERGENCY MEDICINE

## 2017-01-01 PROCEDURE — 85610 PROTHROMBIN TIME: CPT | Performed by: PHYSICIAN ASSISTANT

## 2017-01-01 PROCEDURE — 85025 COMPLETE CBC W/AUTO DIFF WBC: CPT | Performed by: PODIATRIST

## 2017-01-01 PROCEDURE — 80053 COMPREHEN METABOLIC PANEL: CPT | Performed by: EMERGENCY MEDICINE

## 2017-01-01 PROCEDURE — 87186 SC STD MICRODIL/AGAR DIL: CPT | Performed by: HOSPITALIST

## 2017-01-01 PROCEDURE — 84443 ASSAY THYROID STIM HORMONE: CPT | Performed by: HOSPITALIST

## 2017-01-01 PROCEDURE — 71010 HC CHEST PA OR AP: CPT

## 2017-01-01 PROCEDURE — 81001 URINALYSIS AUTO W/SCOPE: CPT | Performed by: PHYSICIAN ASSISTANT

## 2017-01-01 PROCEDURE — 25010000002 CEFTRIAXONE PER 250 MG: Performed by: EMERGENCY MEDICINE

## 2017-01-01 PROCEDURE — 87077 CULTURE AEROBIC IDENTIFY: CPT | Performed by: PHYSICIAN ASSISTANT

## 2017-01-01 PROCEDURE — 87205 SMEAR GRAM STAIN: CPT | Performed by: PODIATRIST

## 2017-01-01 PROCEDURE — 99285 EMERGENCY DEPT VISIT HI MDM: CPT

## 2017-01-01 PROCEDURE — P9612 CATHETERIZE FOR URINE SPEC: HCPCS

## 2017-01-01 PROCEDURE — 87077 CULTURE AEROBIC IDENTIFY: CPT

## 2017-01-01 PROCEDURE — 99308 SBSQ NF CARE LOW MDM 20: CPT | Performed by: INTERNAL MEDICINE

## 2017-01-01 PROCEDURE — 85610 PROTHROMBIN TIME: CPT | Performed by: INTERNAL MEDICINE

## 2017-01-01 PROCEDURE — 11043 DBRDMT MUSC&/FSCA 1ST 20/<: CPT | Performed by: PODIATRIST

## 2017-01-01 PROCEDURE — 87147 CULTURE TYPE IMMUNOLOGIC: CPT | Performed by: HOSPITALIST

## 2017-01-01 PROCEDURE — 85007 BL SMEAR W/DIFF WBC COUNT: CPT | Performed by: PHYSICIAN ASSISTANT

## 2017-01-01 PROCEDURE — 11042 DBRDMT SUBQ TIS 1ST 20SQCM/<: CPT | Performed by: PODIATRIST

## 2017-01-01 PROCEDURE — 63710000001 INSULIN REGULAR HUMAN PER 5 UNITS: Performed by: NURSE ANESTHETIST, CERTIFIED REGISTERED

## 2017-01-01 PROCEDURE — 84484 ASSAY OF TROPONIN QUANT: CPT | Performed by: HOSPITALIST

## 2017-01-01 PROCEDURE — 63710000001 INSULIN DETEMIR PER 5 UNITS: Performed by: HOSPITALIST

## 2017-01-01 PROCEDURE — 87070 CULTURE OTHR SPECIMN AEROBIC: CPT

## 2017-01-01 PROCEDURE — 86140 C-REACTIVE PROTEIN: CPT | Performed by: PHYSICIAN ASSISTANT

## 2017-01-01 PROCEDURE — 85007 BL SMEAR W/DIFF WBC COUNT: CPT | Performed by: PODIATRIST

## 2017-01-01 PROCEDURE — 99284 EMERGENCY DEPT VISIT MOD MDM: CPT

## 2017-01-01 PROCEDURE — 94660 CPAP INITIATION&MGMT: CPT

## 2017-01-01 PROCEDURE — 85651 RBC SED RATE NONAUTOMATED: CPT | Performed by: PHYSICIAN ASSISTANT

## 2017-01-01 PROCEDURE — 96361 HYDRATE IV INFUSION ADD-ON: CPT

## 2017-01-01 PROCEDURE — 99309 SBSQ NF CARE MODERATE MDM 30: CPT | Performed by: NURSE PRACTITIONER

## 2017-01-01 PROCEDURE — 85610 PROTHROMBIN TIME: CPT | Performed by: EMERGENCY MEDICINE

## 2017-01-01 PROCEDURE — 87077 CULTURE AEROBIC IDENTIFY: CPT | Performed by: HOSPITALIST

## 2017-01-01 PROCEDURE — 80069 RENAL FUNCTION PANEL: CPT | Performed by: INTERNAL MEDICINE

## 2017-01-01 PROCEDURE — 94799 UNLISTED PULMONARY SVC/PX: CPT

## 2017-01-01 PROCEDURE — 93459 L HRT ART/GRFT ANGIO: CPT | Performed by: INTERNAL MEDICINE

## 2017-01-01 PROCEDURE — 84484 ASSAY OF TROPONIN QUANT: CPT | Performed by: EMERGENCY MEDICINE

## 2017-01-01 PROCEDURE — 87077 CULTURE AEROBIC IDENTIFY: CPT | Performed by: EMERGENCY MEDICINE

## 2017-01-01 PROCEDURE — 87081 CULTURE SCREEN ONLY: CPT | Performed by: PODIATRIST

## 2017-01-01 PROCEDURE — 87186 SC STD MICRODIL/AGAR DIL: CPT

## 2017-01-01 PROCEDURE — 29580 STRAPPING UNNA BOOT: CPT | Performed by: PODIATRIST

## 2017-01-01 PROCEDURE — 15276 SKIN SUB GRAFT F/N/HF/G ADDL: CPT | Performed by: PODIATRIST

## 2017-01-01 PROCEDURE — 85610 PROTHROMBIN TIME: CPT

## 2017-01-01 PROCEDURE — 85007 BL SMEAR W/DIFF WBC COUNT: CPT | Performed by: EMERGENCY MEDICINE

## 2017-01-01 PROCEDURE — 25010000002 DEXAMETHASONE SODIUM PHOSPHATE 10 MG/ML SOLUTION

## 2017-01-01 PROCEDURE — 90935 HEMODIALYSIS ONE EVALUATION: CPT

## 2017-01-01 PROCEDURE — 99214 OFFICE O/P EST MOD 30 MIN: CPT | Performed by: PODIATRIST

## 2017-01-01 PROCEDURE — 80053 COMPREHEN METABOLIC PANEL: CPT | Performed by: HOSPITALIST

## 2017-01-01 PROCEDURE — 85007 BL SMEAR W/DIFF WBC COUNT: CPT | Performed by: INTERNAL MEDICINE

## 2017-01-01 PROCEDURE — 63710000001 INSULIN ASPART PER 5 UNITS: Performed by: HOSPITALIST

## 2017-01-01 PROCEDURE — 80053 COMPREHEN METABOLIC PANEL: CPT | Performed by: PHYSICIAN ASSISTANT

## 2017-01-01 PROCEDURE — 85610 PROTHROMBIN TIME: CPT | Performed by: PODIATRIST

## 2017-01-01 PROCEDURE — 84484 ASSAY OF TROPONIN QUANT: CPT | Performed by: INTERNAL MEDICINE

## 2017-01-01 PROCEDURE — 87186 SC STD MICRODIL/AGAR DIL: CPT | Performed by: PODIATRIST

## 2017-01-01 PROCEDURE — 87186 SC STD MICRODIL/AGAR DIL: CPT | Performed by: PHYSICIAN ASSISTANT

## 2017-01-01 PROCEDURE — 96374 THER/PROPH/DIAG INJ IV PUSH: CPT

## 2017-01-01 PROCEDURE — 81001 URINALYSIS AUTO W/SCOPE: CPT | Performed by: EMERGENCY MEDICINE

## 2017-01-01 PROCEDURE — 85610 PROTHROMBIN TIME: CPT | Performed by: NURSE ANESTHETIST, CERTIFIED REGISTERED

## 2017-01-01 PROCEDURE — 93005 ELECTROCARDIOGRAM TRACING: CPT | Performed by: EMERGENCY MEDICINE

## 2017-01-01 PROCEDURE — 71020 HC CHEST PA AND LATERAL: CPT

## 2017-01-01 PROCEDURE — 85025 COMPLETE CBC W/AUTO DIFF WBC: CPT | Performed by: INTERNAL MEDICINE

## 2017-01-01 PROCEDURE — 81003 URINALYSIS AUTO W/O SCOPE: CPT | Performed by: PHYSICIAN ASSISTANT

## 2017-01-01 PROCEDURE — 99222 1ST HOSP IP/OBS MODERATE 55: CPT | Performed by: HOSPITALIST

## 2017-01-01 PROCEDURE — 73630 X-RAY EXAM OF FOOT: CPT

## 2017-01-01 PROCEDURE — 83735 ASSAY OF MAGNESIUM: CPT | Performed by: HOSPITALIST

## 2017-01-01 PROCEDURE — 87086 URINE CULTURE/COLONY COUNT: CPT | Performed by: PHYSICIAN ASSISTANT

## 2017-01-01 PROCEDURE — 25010000002 MIDAZOLAM PER 1 MG

## 2017-01-01 PROCEDURE — 93005 ELECTROCARDIOGRAM TRACING: CPT | Performed by: INTERNAL MEDICINE

## 2017-01-01 PROCEDURE — B2151ZZ FLUOROSCOPY OF LEFT HEART USING LOW OSMOLAR CONTRAST: ICD-10-PCS | Performed by: INTERNAL MEDICINE

## 2017-01-01 PROCEDURE — 85730 THROMBOPLASTIN TIME PARTIAL: CPT | Performed by: PHYSICIAN ASSISTANT

## 2017-01-01 PROCEDURE — 15275 SKIN SUB GRAFT FACE/NK/HF/G: CPT | Performed by: PODIATRIST

## 2017-01-01 PROCEDURE — 99232 SBSQ HOSP IP/OBS MODERATE 35: CPT | Performed by: HOSPITALIST

## 2017-01-01 PROCEDURE — 87205 SMEAR GRAM STAIN: CPT

## 2017-01-01 PROCEDURE — 74176 CT ABD & PELVIS W/O CONTRAST: CPT

## 2017-01-01 PROCEDURE — 87070 CULTURE OTHR SPECIMN AEROBIC: CPT | Performed by: PODIATRIST

## 2017-01-01 PROCEDURE — 87186 SC STD MICRODIL/AGAR DIL: CPT | Performed by: EMERGENCY MEDICINE

## 2017-01-01 PROCEDURE — 87075 CULTR BACTERIA EXCEPT BLOOD: CPT | Performed by: PODIATRIST

## 2017-01-01 PROCEDURE — 99222 1ST HOSP IP/OBS MODERATE 55: CPT | Performed by: PODIATRIST

## 2017-01-01 PROCEDURE — 99204 OFFICE O/P NEW MOD 45 MIN: CPT | Performed by: PODIATRIST

## 2017-01-01 PROCEDURE — 83880 ASSAY OF NATRIURETIC PEPTIDE: CPT | Performed by: EMERGENCY MEDICINE

## 2017-01-01 PROCEDURE — 87075 CULTR BACTERIA EXCEPT BLOOD: CPT

## 2017-01-01 PROCEDURE — 0 IOPAMIDOL PER 1 ML: Performed by: INTERNAL MEDICINE

## 2017-01-01 PROCEDURE — 25010000002 CEFTRIAXONE: Performed by: PHYSICIAN ASSISTANT

## 2017-01-01 PROCEDURE — 36415 COLL VENOUS BLD VENIPUNCTURE: CPT

## 2017-01-01 PROCEDURE — 87070 CULTURE OTHR SPECIMN AEROBIC: CPT | Performed by: HOSPITALIST

## 2017-01-01 PROCEDURE — 96366 THER/PROPH/DIAG IV INF ADDON: CPT

## 2017-01-01 PROCEDURE — B2111ZZ FLUOROSCOPY OF MULTIPLE CORONARY ARTERIES USING LOW OSMOLAR CONTRAST: ICD-10-PCS | Performed by: INTERNAL MEDICINE

## 2017-01-01 PROCEDURE — 85007 BL SMEAR W/DIFF WBC COUNT: CPT | Performed by: HOSPITALIST

## 2017-01-01 PROCEDURE — 25010000002 ONDANSETRON PER 1 MG: Performed by: INTERNAL MEDICINE

## 2017-01-01 DEVICE — ALLOGRFT AMNIO AMNIOFIX 4X6CM: Type: IMPLANTABLE DEVICE | Site: FOOT | Status: FUNCTIONAL

## 2017-01-01 DEVICE — ALLOGRFT AMNIO AMNIOFIX 4X4CM: Type: IMPLANTABLE DEVICE | Site: LEG | Status: FUNCTIONAL

## 2017-01-01 RX ORDER — ATORVASTATIN CALCIUM 40 MG/1
TABLET, FILM COATED ORAL
COMMUNITY
Start: 2017-01-01 | End: 2017-01-01 | Stop reason: SDUPTHER

## 2017-01-01 RX ORDER — SODIUM CHLORIDE 0.9 % (FLUSH) 0.9 %
10 SYRINGE (ML) INJECTION AS NEEDED
Status: DISCONTINUED | OUTPATIENT
Start: 2017-01-01 | End: 2017-01-01 | Stop reason: HOSPADM

## 2017-01-01 RX ORDER — ACETAMINOPHEN AND CODEINE PHOSPHATE 300; 30 MG/1; MG/1
1 TABLET ORAL EVERY 4 HOURS PRN
COMMUNITY
End: 2017-01-01

## 2017-01-01 RX ORDER — CALCIUM CITRATE/VITAMIN D3 200MG-6.25
1 TABLET ORAL AS NEEDED
Refills: 0 | COMMUNITY
Start: 2016-10-21

## 2017-01-01 RX ORDER — BISACODYL 5 MG/1
5 TABLET, DELAYED RELEASE ORAL DAILY PRN
COMMUNITY

## 2017-01-01 RX ORDER — AMOXICILLIN AND CLAVULANATE POTASSIUM 500; 125 MG/1; MG/1
1 TABLET, FILM COATED ORAL EVERY 12 HOURS SCHEDULED
Qty: 8 TABLET | Refills: 0 | Status: SHIPPED | OUTPATIENT
Start: 2017-01-01 | End: 2017-01-01

## 2017-01-01 RX ORDER — COLLAGENASE SANTYL 250 [ARB'U]/G
OINTMENT TOPICAL
COMMUNITY
Start: 2017-01-01

## 2017-01-01 RX ORDER — AMIODARONE HYDROCHLORIDE 200 MG/1
100 TABLET ORAL DAILY
COMMUNITY
End: 2017-01-01

## 2017-01-01 RX ORDER — LISINOPRIL 5 MG/1
5 TABLET ORAL DAILY
COMMUNITY

## 2017-01-01 RX ORDER — LIDOCAINE HYDROCHLORIDE 10 MG/ML
INJECTION, SOLUTION INFILTRATION; PERINEURAL
Status: DISCONTINUED
Start: 2017-01-01 | End: 2017-01-01 | Stop reason: HOSPADM

## 2017-01-01 RX ORDER — METOPROLOL SUCCINATE 25 MG/1
25 TABLET, EXTENDED RELEASE ORAL EVERY 12 HOURS SCHEDULED
Status: DISCONTINUED | OUTPATIENT
Start: 2017-01-01 | End: 2017-01-01 | Stop reason: HOSPADM

## 2017-01-01 RX ORDER — ONDANSETRON 2 MG/ML
4 INJECTION INTRAMUSCULAR; INTRAVENOUS EVERY 6 HOURS PRN
Status: DISCONTINUED | OUTPATIENT
Start: 2017-01-01 | End: 2017-01-01 | Stop reason: HOSPADM

## 2017-01-01 RX ORDER — CLONAZEPAM 1 MG/1
0.5 TABLET ORAL NIGHTLY PRN
COMMUNITY
End: 2017-01-01

## 2017-01-01 RX ORDER — SACCHAROMYCES BOULARDII 250 MG
250 CAPSULE ORAL 2 TIMES DAILY
COMMUNITY

## 2017-01-01 RX ORDER — TEMAZEPAM 7.5 MG/1
7.5 CAPSULE ORAL NIGHTLY PRN
COMMUNITY

## 2017-01-01 RX ORDER — AMIODARONE HYDROCHLORIDE 200 MG/1
100 TABLET ORAL DAILY
Status: DISCONTINUED | OUTPATIENT
Start: 2017-01-01 | End: 2017-01-01 | Stop reason: HOSPADM

## 2017-01-01 RX ORDER — LOPERAMIDE HYDROCHLORIDE 2 MG/1
2 CAPSULE ORAL 4 TIMES DAILY PRN
COMMUNITY

## 2017-01-01 RX ORDER — ACETAMINOPHEN AND CODEINE PHOSPHATE 300; 30 MG/1; MG/1
TABLET ORAL
COMMUNITY
Start: 2017-01-01

## 2017-01-01 RX ORDER — BACITRACIN 50000 [IU]/1
INJECTION, POWDER, FOR SOLUTION INTRAMUSCULAR
Status: DISCONTINUED
Start: 2017-01-01 | End: 2017-01-01 | Stop reason: HOSPADM

## 2017-01-01 RX ORDER — BUPIVACAINE HYDROCHLORIDE 5 MG/ML
INJECTION, SOLUTION EPIDURAL; INTRACAUDAL
Status: DISPENSED
Start: 2017-01-01 | End: 2017-01-01

## 2017-01-01 RX ORDER — SODIUM CHLORIDE 0.9 % (FLUSH) 0.9 %
1-10 SYRINGE (ML) INJECTION AS NEEDED
Status: DISCONTINUED | OUTPATIENT
Start: 2017-01-01 | End: 2017-01-01 | Stop reason: HOSPADM

## 2017-01-01 RX ORDER — DOXYCYCLINE 100 MG/1
100 CAPSULE ORAL EVERY 12 HOURS SCHEDULED
Status: DISCONTINUED | OUTPATIENT
Start: 2017-01-01 | End: 2017-01-01 | Stop reason: HOSPADM

## 2017-01-01 RX ORDER — HEPARIN SODIUM 5000 [USP'U]/ML
5000 INJECTION, SOLUTION INTRAVENOUS; SUBCUTANEOUS EVERY 12 HOURS SCHEDULED
Status: DISCONTINUED | OUTPATIENT
Start: 2017-01-01 | End: 2017-01-01 | Stop reason: SDUPTHER

## 2017-01-01 RX ORDER — DOXYCYCLINE 100 MG/1
100 CAPSULE ORAL EVERY 12 HOURS SCHEDULED
Qty: 10 CAPSULE | Refills: 0 | Status: SHIPPED | OUTPATIENT
Start: 2017-01-01 | End: 2017-01-01

## 2017-01-01 RX ORDER — CIPROFLOXACIN 500 MG/1
500 TABLET, FILM COATED ORAL 2 TIMES DAILY
Qty: 10 TABLET | Refills: 1 | Status: SHIPPED | OUTPATIENT
Start: 2017-01-01 | End: 2017-01-01 | Stop reason: HOSPADM

## 2017-01-01 RX ORDER — METOPROLOL SUCCINATE 25 MG/1
12.5 TABLET, EXTENDED RELEASE ORAL EVERY 12 HOURS SCHEDULED
Status: DISCONTINUED | OUTPATIENT
Start: 2017-01-01 | End: 2017-01-01

## 2017-01-01 RX ORDER — DOCUSATE SODIUM 100 MG/1
100 CAPSULE, LIQUID FILLED ORAL 2 TIMES DAILY
COMMUNITY

## 2017-01-01 RX ORDER — CLINDAMYCIN HYDROCHLORIDE 300 MG/1
300 CAPSULE ORAL 3 TIMES DAILY
Qty: 20 CAPSULE | Refills: 1 | Status: SHIPPED | OUTPATIENT
Start: 2017-01-01 | End: 2017-01-01 | Stop reason: SDUPTHER

## 2017-01-01 RX ORDER — SODIUM CHLORIDE, SODIUM LACTATE, POTASSIUM CHLORIDE, CALCIUM CHLORIDE 600; 310; 30; 20 MG/100ML; MG/100ML; MG/100ML; MG/100ML
100 INJECTION, SOLUTION INTRAVENOUS CONTINUOUS
Status: DISCONTINUED | OUTPATIENT
Start: 2017-01-01 | End: 2017-01-01

## 2017-01-01 RX ORDER — ASPIRIN 325 MG
325 TABLET ORAL DAILY
COMMUNITY
End: 2017-01-01 | Stop reason: DRUGHIGH

## 2017-01-01 RX ORDER — LISINOPRIL 40 MG/1
40 TABLET ORAL DAILY
COMMUNITY
End: 2017-01-01 | Stop reason: DRUGHIGH

## 2017-01-01 RX ORDER — HYDROCODONE BITARTRATE AND ACETAMINOPHEN 5; 325 MG/1; MG/1
1 TABLET ORAL EVERY 8 HOURS
Qty: 24 TABLET | Refills: 0 | Status: SHIPPED | OUTPATIENT
Start: 2017-01-01 | End: 2017-01-01

## 2017-01-01 RX ORDER — CEFDINIR 300 MG/1
300 CAPSULE ORAL 2 TIMES DAILY
Qty: 14 CAPSULE | Refills: 0 | Status: SHIPPED | OUTPATIENT
Start: 2017-01-01

## 2017-01-01 RX ORDER — TORSEMIDE 100 MG/1
100 TABLET ORAL DAILY
COMMUNITY
End: 2017-01-01

## 2017-01-01 RX ORDER — ACETAMINOPHEN 325 MG/1
650 TABLET ORAL EVERY 4 HOURS PRN
Status: DISCONTINUED | OUTPATIENT
Start: 2017-01-01 | End: 2017-01-01 | Stop reason: HOSPADM

## 2017-01-01 RX ORDER — INSULIN ASPART 100 [IU]/ML
4 INJECTION, SUSPENSION SUBCUTANEOUS 2 TIMES DAILY WITH MEALS
Status: DISCONTINUED | OUTPATIENT
Start: 2017-01-01 | End: 2017-01-01 | Stop reason: HOSPADM

## 2017-01-01 RX ORDER — LIDOCAINE HYDROCHLORIDE 20 MG/ML
INJECTION, SOLUTION INFILTRATION; PERINEURAL
Status: COMPLETED
Start: 2017-01-01 | End: 2017-01-01

## 2017-01-01 RX ORDER — CLINDAMYCIN HYDROCHLORIDE 300 MG/1
300 CAPSULE ORAL 3 TIMES DAILY
Qty: 20 CAPSULE | Refills: 1 | Status: SHIPPED | OUTPATIENT
Start: 2017-01-01 | End: 2017-01-01

## 2017-01-01 RX ORDER — ASPIRIN 325 MG
325 TABLET ORAL ONCE
Status: DISCONTINUED | OUTPATIENT
Start: 2017-01-01 | End: 2017-01-01

## 2017-01-01 RX ORDER — METOPROLOL SUCCINATE 25 MG/1
12.5 TABLET, EXTENDED RELEASE ORAL DAILY
COMMUNITY
End: 2017-01-01

## 2017-01-01 RX ORDER — LIDOCAINE HYDROCHLORIDE 10 MG/ML
INJECTION, SOLUTION INFILTRATION; PERINEURAL
Status: DISPENSED
Start: 2017-01-01 | End: 2017-01-01

## 2017-01-01 RX ORDER — SODIUM CHLORIDE, SODIUM LACTATE, POTASSIUM CHLORIDE, CALCIUM CHLORIDE 600; 310; 30; 20 MG/100ML; MG/100ML; MG/100ML; MG/100ML
100 INJECTION, SOLUTION INTRAVENOUS CONTINUOUS
Status: CANCELLED | OUTPATIENT
Start: 2017-01-01

## 2017-01-01 RX ORDER — LOSARTAN POTASSIUM 25 MG/1
TABLET ORAL
COMMUNITY
Start: 2016-11-16 | End: 2017-01-01

## 2017-01-01 RX ORDER — AMOXICILLIN AND CLAVULANATE POTASSIUM 500; 125 MG/1; MG/1
1 TABLET, FILM COATED ORAL EVERY 12 HOURS SCHEDULED
Status: DISCONTINUED | OUTPATIENT
Start: 2017-01-01 | End: 2017-01-01 | Stop reason: HOSPADM

## 2017-01-01 RX ORDER — CIPROFLOXACIN 500 MG/1
500 TABLET, FILM COATED ORAL 2 TIMES DAILY
Qty: 10 TABLET | Refills: 1 | Status: SHIPPED | OUTPATIENT
Start: 2017-01-01 | End: 2017-01-01 | Stop reason: SDUPTHER

## 2017-01-01 RX ORDER — SODIUM POLYSTYRENE SULFONATE 15 G/60ML
15 SUSPENSION ORAL; RECTAL ONCE
Status: COMPLETED | OUTPATIENT
Start: 2017-01-01 | End: 2017-01-01

## 2017-01-01 RX ORDER — SACCHAROMYCES BOULARDII 250 MG
250 CAPSULE ORAL EVERY 12 HOURS SCHEDULED
Status: DISCONTINUED | OUTPATIENT
Start: 2017-01-01 | End: 2017-01-01 | Stop reason: HOSPADM

## 2017-01-01 RX ORDER — BACITRACIN 50000 [IU]/1
INJECTION, POWDER, FOR SOLUTION INTRAMUSCULAR
Status: DISPENSED
Start: 2017-01-01 | End: 2017-01-01

## 2017-01-01 RX ORDER — ACETAMINOPHEN 325 MG/1
TABLET ORAL EVERY 4 HOURS PRN
COMMUNITY
End: 2017-01-01

## 2017-01-01 RX ORDER — ONDANSETRON 4 MG/1
4 TABLET, FILM COATED ORAL EVERY 8 HOURS PRN
Status: DISCONTINUED | OUTPATIENT
Start: 2017-01-01 | End: 2017-01-01 | Stop reason: HOSPADM

## 2017-01-01 RX ORDER — BUPIVACAINE HYDROCHLORIDE 2.5 MG/ML
INJECTION, SOLUTION EPIDURAL; INFILTRATION; INTRACAUDAL
Status: COMPLETED
Start: 2017-01-01 | End: 2017-01-01

## 2017-01-01 RX ORDER — CLONAZEPAM 0.5 MG/1
0.5 TABLET ORAL 3 TIMES DAILY PRN
Status: DISCONTINUED | OUTPATIENT
Start: 2017-01-01 | End: 2017-01-01 | Stop reason: HOSPADM

## 2017-01-01 RX ORDER — SODIUM CHLORIDE 0.9 % (FLUSH) 0.9 %
1-10 SYRINGE (ML) INJECTION AS NEEDED
Status: CANCELLED | OUTPATIENT
Start: 2017-01-01

## 2017-01-01 RX ORDER — LIDOCAINE HYDROCHLORIDE 10 MG/ML
INJECTION, SOLUTION INFILTRATION; PERINEURAL AS NEEDED
Status: DISCONTINUED | OUTPATIENT
Start: 2017-01-01 | End: 2017-01-01 | Stop reason: HOSPADM

## 2017-01-01 RX ORDER — IPRATROPIUM BROMIDE AND ALBUTEROL SULFATE 2.5; .5 MG/3ML; MG/3ML
SOLUTION RESPIRATORY (INHALATION)
Status: COMPLETED
Start: 2017-01-01 | End: 2017-01-01

## 2017-01-01 RX ORDER — CLOPIDOGREL BISULFATE 75 MG/1
75 TABLET ORAL DAILY
Status: DISCONTINUED | OUTPATIENT
Start: 2017-01-01 | End: 2017-01-01

## 2017-01-01 RX ORDER — METOPROLOL TARTRATE 100 MG/1
100 TABLET ORAL 2 TIMES DAILY
COMMUNITY
End: 2017-01-01 | Stop reason: DRUGHIGH

## 2017-01-01 RX ORDER — POLYETHYLENE GLYCOL 3350 17 G/17G
POWDER, FOR SOLUTION ORAL
COMMUNITY
Start: 2017-01-01

## 2017-01-01 RX ORDER — HEPARIN SODIUM 1000 [USP'U]/ML
4000 INJECTION, SOLUTION INTRAVENOUS; SUBCUTANEOUS ONCE
Status: DISCONTINUED | OUTPATIENT
Start: 2017-01-01 | End: 2017-01-01 | Stop reason: HOSPADM

## 2017-01-01 RX ORDER — ASCORBIC ACID 500 MG
500 TABLET ORAL DAILY
COMMUNITY

## 2017-01-01 RX ORDER — ATORVASTATIN CALCIUM 10 MG/1
TABLET, FILM COATED ORAL DAILY
COMMUNITY

## 2017-01-01 RX ORDER — IPRATROPIUM BROMIDE AND ALBUTEROL SULFATE 2.5; .5 MG/3ML; MG/3ML
3 SOLUTION RESPIRATORY (INHALATION) ONCE
Status: COMPLETED | OUTPATIENT
Start: 2017-01-01 | End: 2017-01-01

## 2017-01-01 RX ORDER — MUPIROCIN CALCIUM 20 MG/G
CREAM TOPICAL
COMMUNITY
Start: 2017-01-01 | End: 2017-01-01 | Stop reason: ALTCHOICE

## 2017-01-01 RX ORDER — UREA 10 %
220 LOTION (ML) TOPICAL DAILY
COMMUNITY

## 2017-01-01 RX ORDER — WARFARIN SODIUM 4 MG/1
3 TABLET ORAL
COMMUNITY
Start: 2017-01-01

## 2017-01-01 RX ORDER — KETAMINE HYDROCHLORIDE 50 MG/ML
INJECTION, SOLUTION, CONCENTRATE INTRAMUSCULAR; INTRAVENOUS AS NEEDED
Status: DISCONTINUED | OUTPATIENT
Start: 2017-01-01 | End: 2017-01-01 | Stop reason: SURG

## 2017-01-01 RX ORDER — DEXAMETHASONE SODIUM PHOSPHATE 10 MG/ML
INJECTION, SOLUTION INTRAMUSCULAR; INTRAVENOUS
Status: COMPLETED
Start: 2017-01-01 | End: 2017-01-01

## 2017-01-01 RX ORDER — POLYETHYLENE GLYCOL 3350 17 G/17G
17 POWDER, FOR SOLUTION ORAL DAILY
COMMUNITY
End: 2017-01-01

## 2017-01-01 RX ORDER — CARVEDILOL 25 MG/1
TABLET ORAL
COMMUNITY
Start: 2016-11-11 | End: 2017-01-01 | Stop reason: SDUPTHER

## 2017-01-01 RX ORDER — SODIUM CHLORIDE 9 MG/ML
100 INJECTION, SOLUTION INTRAVENOUS CONTINUOUS
Status: DISCONTINUED | OUTPATIENT
Start: 2017-01-01 | End: 2017-01-01 | Stop reason: HOSPADM

## 2017-01-01 RX ORDER — CEFTRIAXONE 1 G/1
1000 INJECTION, POWDER, FOR SOLUTION INTRAMUSCULAR; INTRAVENOUS ONCE
Status: DISCONTINUED | OUTPATIENT
Start: 2017-01-01 | End: 2017-01-01

## 2017-01-01 RX ORDER — AMIODARONE HYDROCHLORIDE 100 MG/1
TABLET ORAL
COMMUNITY
Start: 2017-01-01

## 2017-01-01 RX ORDER — MIDAZOLAM HYDROCHLORIDE 1 MG/ML
INJECTION INTRAMUSCULAR; INTRAVENOUS
Status: COMPLETED
Start: 2017-01-01 | End: 2017-01-01

## 2017-01-01 RX ORDER — FOLIC ACID/VIT B COMPLEX AND C 0.8 MG
1 TABLET ORAL DAILY
Status: DISCONTINUED | OUTPATIENT
Start: 2017-01-01 | End: 2017-01-01 | Stop reason: HOSPADM

## 2017-01-01 RX ORDER — SODIUM CHLORIDE, SODIUM LACTATE, POTASSIUM CHLORIDE, CALCIUM CHLORIDE 600; 310; 30; 20 MG/100ML; MG/100ML; MG/100ML; MG/100ML
100 INJECTION, SOLUTION INTRAVENOUS CONTINUOUS
Status: DISCONTINUED | OUTPATIENT
Start: 2017-01-01 | End: 2017-01-01 | Stop reason: HOSPADM

## 2017-01-01 RX ORDER — ALBUTEROL SULFATE 2.5 MG/3ML
SOLUTION RESPIRATORY (INHALATION)
Refills: 0 | COMMUNITY
Start: 2016-10-21 | End: 2017-01-01

## 2017-01-01 RX ORDER — HYDROCODONE BITARTRATE AND ACETAMINOPHEN 5; 325 MG/1; MG/1
1 TABLET ORAL EVERY 4 HOURS PRN
Status: DISCONTINUED | OUTPATIENT
Start: 2017-01-01 | End: 2017-01-01 | Stop reason: HOSPADM

## 2017-01-01 RX ORDER — ASPIRIN 81 MG/1
81 TABLET ORAL DAILY
Status: DISCONTINUED | OUTPATIENT
Start: 2017-01-01 | End: 2017-01-01

## 2017-01-01 RX ORDER — LIDOCAINE HYDROCHLORIDE 10 MG/ML
2.1 INJECTION, SOLUTION EPIDURAL; INFILTRATION; INTRACAUDAL; PERINEURAL ONCE
Status: DISCONTINUED | OUTPATIENT
Start: 2017-01-01 | End: 2017-01-01

## 2017-01-01 RX ORDER — CLONAZEPAM 0.5 MG/1
0.5 TABLET ORAL 3 TIMES DAILY PRN
COMMUNITY
End: 2017-01-01 | Stop reason: ALTCHOICE

## 2017-01-01 RX ORDER — FOLIC ACID/VIT B COMPLEX AND C 0.8 MG
1 TABLET ORAL DAILY
COMMUNITY

## 2017-01-01 RX ORDER — POTASSIUM CHLORIDE 750 MG/1
20 TABLET, FILM COATED, EXTENDED RELEASE ORAL DAILY
COMMUNITY
End: 2017-01-01

## 2017-01-01 RX ORDER — ASPIRIN 81 MG/1
81 TABLET ORAL DAILY
COMMUNITY

## 2017-01-01 RX ORDER — ATORVASTATIN CALCIUM 40 MG/1
40 TABLET, FILM COATED ORAL DAILY
Status: DISCONTINUED | OUTPATIENT
Start: 2017-01-01 | End: 2017-01-01 | Stop reason: HOSPADM

## 2017-01-01 RX ORDER — ISOSORBIDE MONONITRATE 30 MG/1
TABLET, EXTENDED RELEASE ORAL
COMMUNITY
Start: 2016-11-11 | End: 2017-01-01

## 2017-01-01 RX ORDER — ONDANSETRON 4 MG/1
4 TABLET, FILM COATED ORAL EVERY 8 HOURS PRN
COMMUNITY

## 2017-01-01 RX ORDER — ONDANSETRON 4 MG/1
4 TABLET, FILM COATED ORAL EVERY 8 HOURS PRN
COMMUNITY
End: 2017-01-01

## 2017-01-01 RX ORDER — METOPROLOL TARTRATE 5 MG/5ML
5 INJECTION INTRAVENOUS ONCE
Status: COMPLETED | OUTPATIENT
Start: 2017-01-01 | End: 2017-01-01

## 2017-01-01 RX ORDER — LISINOPRIL 5 MG/1
5 TABLET ORAL DAILY
Status: DISCONTINUED | OUTPATIENT
Start: 2017-01-01 | End: 2017-01-01

## 2017-01-01 RX ORDER — FUROSEMIDE 20 MG/1
40 TABLET ORAL 2 TIMES DAILY
COMMUNITY
Start: 2016-11-16 | End: 2017-01-01

## 2017-01-01 RX ORDER — BUPIVACAINE HYDROCHLORIDE AND EPINEPHRINE 5; 5 MG/ML; UG/ML
INJECTION, SOLUTION EPIDURAL; INTRACAUDAL; PERINEURAL
Status: DISCONTINUED
Start: 2017-01-01 | End: 2017-01-01 | Stop reason: HOSPADM

## 2017-01-01 RX ORDER — WARFARIN SODIUM 6 MG/1
6 TABLET ORAL
COMMUNITY
Start: 2017-01-01 | End: 2017-01-01

## 2017-01-01 RX ORDER — WARFARIN SODIUM 4 MG/1
4 TABLET ORAL
COMMUNITY
End: 2017-01-01 | Stop reason: DRUGHIGH

## 2017-01-01 RX ORDER — CLOPIDOGREL BISULFATE 75 MG/1
75 TABLET ORAL DAILY
COMMUNITY
Start: 2016-12-08

## 2017-01-01 RX ORDER — INSULIN ASPART 100 [IU]/ML
4 INJECTION, SUSPENSION SUBCUTANEOUS 2 TIMES DAILY WITH MEALS
COMMUNITY
Start: 2016-10-21

## 2017-01-01 RX ORDER — IPRATROPIUM BROMIDE AND ALBUTEROL SULFATE 2.5; .5 MG/3ML; MG/3ML
SOLUTION RESPIRATORY (INHALATION)
COMMUNITY
Start: 2017-01-01

## 2017-01-01 RX ORDER — PRASUGREL 10 MG/1
60 TABLET, FILM COATED ORAL ONCE
Status: COMPLETED | OUTPATIENT
Start: 2017-01-01 | End: 2017-01-01

## 2017-01-01 RX ADMIN — RDII 250 MG CAPSULE 250 MG: at 09:00

## 2017-01-01 RX ADMIN — HUMAN INSULIN 10 UNITS: 100 INJECTION, SOLUTION SUBCUTANEOUS at 10:19

## 2017-01-01 RX ADMIN — MIDAZOLAM HYDROCHLORIDE 2 MG: 1 INJECTION, SOLUTION INTRAMUSCULAR; INTRAVENOUS at 10:40

## 2017-01-01 RX ADMIN — ATORVASTATIN CALCIUM 40 MG: 40 TABLET, FILM COATED ORAL at 08:39

## 2017-01-01 RX ADMIN — AMOXICILLIN AND CLAVULANATE POTASSIUM 500 MG: 500; 125 TABLET, FILM COATED ORAL at 23:45

## 2017-01-01 RX ADMIN — MIDAZOLAM HYDROCHLORIDE 0.5 MG: 1 INJECTION, SOLUTION INTRAMUSCULAR; INTRAVENOUS at 11:06

## 2017-01-01 RX ADMIN — HYDROCODONE BITARTRATE AND ACETAMINOPHEN 1 TABLET: 5; 325 TABLET ORAL at 18:12

## 2017-01-01 RX ADMIN — CLOPIDOGREL BISULFATE 75 MG: 75 TABLET ORAL at 17:55

## 2017-01-01 RX ADMIN — METOPROLOL SUCCINATE 25 MG: 25 TABLET, EXTENDED RELEASE ORAL at 09:00

## 2017-01-01 RX ADMIN — AMIODARONE HYDROCHLORIDE 100 MG: 200 TABLET ORAL at 09:00

## 2017-01-01 RX ADMIN — RDII 250 MG CAPSULE 250 MG: at 17:55

## 2017-01-01 RX ADMIN — Medication 1 TABLET: at 09:00

## 2017-01-01 RX ADMIN — ONDANSETRON 4 MG: 2 INJECTION INTRAMUSCULAR; INTRAVENOUS at 12:39

## 2017-01-01 RX ADMIN — WARFARIN SODIUM 6 MG: 5 TABLET ORAL at 18:25

## 2017-01-01 RX ADMIN — IPRATROPIUM BROMIDE AND ALBUTEROL SULFATE 3 ML: 2.5; .5 SOLUTION RESPIRATORY (INHALATION) at 10:51

## 2017-01-01 RX ADMIN — SODIUM CHLORIDE, POTASSIUM CHLORIDE, SODIUM LACTATE AND CALCIUM CHLORIDE 100 ML/HR: 600; 310; 30; 20 INJECTION, SOLUTION INTRAVENOUS at 09:46

## 2017-01-01 RX ADMIN — TICAGRELOR 90 MG: 90 TABLET ORAL at 09:45

## 2017-01-01 RX ADMIN — RDII 250 MG CAPSULE 250 MG: at 21:46

## 2017-01-01 RX ADMIN — SODIUM CHLORIDE, POTASSIUM CHLORIDE, SODIUM LACTATE AND CALCIUM CHLORIDE 100 ML/HR: 600; 310; 30; 20 INJECTION, SOLUTION INTRAVENOUS at 07:39

## 2017-01-01 RX ADMIN — RDII 250 MG CAPSULE 250 MG: at 08:38

## 2017-01-01 RX ADMIN — SODIUM CHLORIDE 500 ML: 9 INJECTION, SOLUTION INTRAVENOUS at 11:15

## 2017-01-01 RX ADMIN — INSULIN ASPART 4 UNITS: 100 INJECTION, SUSPENSION SUBCUTANEOUS at 18:04

## 2017-01-01 RX ADMIN — CLONAZEPAM 0.5 MG: 0.5 TABLET ORAL at 22:39

## 2017-01-01 RX ADMIN — BUPIVACAINE HYDROCHLORIDE 40 ML: 2.5 INJECTION, SOLUTION EPIDURAL; INFILTRATION; INTRACAUDAL; PERINEURAL at 10:40

## 2017-01-01 RX ADMIN — TICAGRELOR 90 MG: 90 TABLET ORAL at 08:40

## 2017-01-01 RX ADMIN — SODIUM CHLORIDE 100 ML/HR: 9 INJECTION, SOLUTION INTRAVENOUS at 09:10

## 2017-01-01 RX ADMIN — Medication 1 TABLET: at 08:41

## 2017-01-01 RX ADMIN — INSULIN ASPART 15 UNITS: 100 INJECTION, SOLUTION INTRAVENOUS; SUBCUTANEOUS at 12:42

## 2017-01-01 RX ADMIN — INSULIN ASPART 15 UNITS: 100 INJECTION, SOLUTION INTRAVENOUS; SUBCUTANEOUS at 08:35

## 2017-01-01 RX ADMIN — INSULIN DETEMIR 20 UNITS: 100 INJECTION, SOLUTION SUBCUTANEOUS at 22:39

## 2017-01-01 RX ADMIN — INSULIN ASPART 15 UNITS: 100 INJECTION, SOLUTION INTRAVENOUS; SUBCUTANEOUS at 12:01

## 2017-01-01 RX ADMIN — SODIUM POLYSTYRENE SULFONATE 15 G: 15 SUSPENSION ORAL; RECTAL at 15:49

## 2017-01-01 RX ADMIN — IPRATROPIUM BROMIDE AND ALBUTEROL SULFATE 3 ML: .5; 3 SOLUTION RESPIRATORY (INHALATION) at 10:51

## 2017-01-01 RX ADMIN — INSULIN ASPART 4 UNITS: 100 INJECTION, SUSPENSION SUBCUTANEOUS at 09:30

## 2017-01-01 RX ADMIN — METOPROLOL TARTRATE 5 MG: 5 INJECTION INTRAVENOUS at 23:18

## 2017-01-01 RX ADMIN — TICAGRELOR 90 MG: 90 TABLET ORAL at 18:13

## 2017-01-01 RX ADMIN — CLONAZEPAM 0.5 MG: 0.5 TABLET ORAL at 23:45

## 2017-01-01 RX ADMIN — ATORVASTATIN CALCIUM 40 MG: 40 TABLET, FILM COATED ORAL at 09:00

## 2017-01-01 RX ADMIN — AMOXICILLIN AND CLAVULANATE POTASSIUM 500 MG: 500; 125 TABLET, FILM COATED ORAL at 08:42

## 2017-01-01 RX ADMIN — ATORVASTATIN CALCIUM 40 MG: 40 TABLET, FILM COATED ORAL at 17:54

## 2017-01-01 RX ADMIN — MIDAZOLAM HYDROCHLORIDE 1 MG: 1 INJECTION, SOLUTION INTRAMUSCULAR; INTRAVENOUS at 11:05

## 2017-01-01 RX ADMIN — DOXYCYCLINE 100 MG: 100 CAPSULE ORAL at 08:40

## 2017-01-01 RX ADMIN — DEXAMETHASONE SODIUM PHOSPHATE 10 MG: 10 INJECTION, SOLUTION INTRAMUSCULAR; INTRAVENOUS at 10:40

## 2017-01-01 RX ADMIN — SODIUM CHLORIDE 100 ML/HR: 9 INJECTION, SOLUTION INTRAVENOUS at 21:50

## 2017-01-01 RX ADMIN — INSULIN ASPART 4 UNITS: 100 INJECTION, SUSPENSION SUBCUTANEOUS at 09:08

## 2017-01-01 RX ADMIN — SODIUM CHLORIDE, POTASSIUM CHLORIDE, SODIUM LACTATE AND CALCIUM CHLORIDE 100 ML/HR: 600; 310; 30; 20 INJECTION, SOLUTION INTRAVENOUS at 13:50

## 2017-01-01 RX ADMIN — AMIODARONE HYDROCHLORIDE 100 MG: 200 TABLET ORAL at 08:37

## 2017-01-01 RX ADMIN — TICAGRELOR 90 MG: 90 TABLET ORAL at 18:14

## 2017-01-01 RX ADMIN — RDII 250 MG CAPSULE 250 MG: at 22:39

## 2017-01-01 RX ADMIN — PRASUGREL HYDROCHLORIDE 60 MG: 10 TABLET, FILM COATED ORAL at 13:00

## 2017-01-01 RX ADMIN — CEFTRIAXONE 1 G: 1 INJECTION, SOLUTION INTRAVENOUS at 12:33

## 2017-01-01 RX ADMIN — METOPROLOL SUCCINATE 25 MG: 25 TABLET, EXTENDED RELEASE ORAL at 21:46

## 2017-01-01 RX ADMIN — INSULIN ASPART 15 UNITS: 100 INJECTION, SOLUTION INTRAVENOUS; SUBCUTANEOUS at 18:02

## 2017-01-01 RX ADMIN — MIDAZOLAM HYDROCHLORIDE 0.5 MG: 1 INJECTION, SOLUTION INTRAMUSCULAR; INTRAVENOUS at 11:10

## 2017-01-01 RX ADMIN — METOPROLOL SUCCINATE 25 MG: 25 TABLET, EXTENDED RELEASE ORAL at 22:39

## 2017-01-01 RX ADMIN — CEFTRIAXONE 1 G: 1 INJECTION, POWDER, FOR SOLUTION INTRAMUSCULAR; INTRAVENOUS at 18:05

## 2017-01-01 RX ADMIN — KETAMINE HYDROCHLORIDE 10 MG: 50 INJECTION, SOLUTION INTRAMUSCULAR; INTRAVENOUS at 11:14

## 2017-01-01 RX ADMIN — AMIODARONE HYDROCHLORIDE 100 MG: 200 TABLET ORAL at 17:54

## 2017-01-01 RX ADMIN — LIDOCAINE HYDROCHLORIDE 10 ML: 20 INJECTION, SOLUTION INFILTRATION; PERINEURAL at 10:40

## 2017-01-01 RX ADMIN — ASPIRIN 81 MG: 81 TABLET, COATED ORAL at 17:55

## 2017-01-01 RX ADMIN — KETAMINE HYDROCHLORIDE 10 MG: 50 INJECTION, SOLUTION INTRAMUSCULAR; INTRAVENOUS at 11:05

## 2017-01-01 RX ADMIN — SODIUM CHLORIDE 100 ML/HR: 9 INJECTION, SOLUTION INTRAVENOUS at 10:56

## 2017-01-01 RX ADMIN — DOXYCYCLINE 100 MG: 100 CAPSULE ORAL at 23:45

## 2017-01-05 ENCOUNTER — OUTSIDE FACILITY SERVICE (OUTPATIENT)
Dept: FAMILY MEDICINE CLINIC | Facility: CLINIC | Age: 72
End: 2017-01-05

## 2017-01-05 PROCEDURE — 99308 SBSQ NF CARE LOW MDM 20: CPT | Performed by: INTERNAL MEDICINE

## 2017-01-11 ENCOUNTER — OUTSIDE FACILITY SERVICE (OUTPATIENT)
Dept: FAMILY MEDICINE CLINIC | Facility: CLINIC | Age: 72
End: 2017-01-11

## 2017-01-11 PROCEDURE — 99307 SBSQ NF CARE SF MDM 10: CPT | Performed by: INTERNAL MEDICINE

## 2017-01-19 ENCOUNTER — OUTSIDE FACILITY SERVICE (OUTPATIENT)
Dept: FAMILY MEDICINE CLINIC | Facility: CLINIC | Age: 72
End: 2017-01-19

## 2017-01-19 PROCEDURE — 99308 SBSQ NF CARE LOW MDM 20: CPT | Performed by: INTERNAL MEDICINE

## 2017-01-25 NOTE — PROGRESS NOTES
Subjective   Patient ID: Linda Snyder is a 71 y.o. female with diabetes and end-stage renal disease and CAD and multiple other medical comorbidities is now in a [facility] nursing home after a fall on her left side.  She suffered some scrapes and bruises and wounds to her left leg and also has to heel wounds.  She states the wounds on the left side and getting much better.  She states she has a history of stents in both legs she believes.  Says she has some any medical issues and medicine she can ever remember them all.      History of Present Illness    Review of Systems   Constitutional: Negative for diaphoresis, fever and unexpected weight change.   HENT: Negative for dental problem and sore throat.    Eyes: Negative for visual disturbance.   Respiratory: Negative for shortness of breath.    Cardiovascular: Negative for chest pain.   Gastrointestinal: Negative for abdominal pain, constipation, diarrhea, nausea and vomiting.   Genitourinary: Negative for difficulty urinating and frequency.   Neurological: Negative for headaches.   Hematological: Does not bruise/bleed easily.   All other systems reviewed and are negative.      Past Medical History   Diagnosis Date   • CAD (coronary artery disease)    • CHF (congestive heart failure)    • Diabetes    • Hypertension    • Renal disease    • Sleep apnea         Past Surgical History   Procedure Laterality Date   • Hysterectomy     • Coronary stent placement     • Foot surgery Left      3rd, 4th, 5th MT amputation       No Known Allergies    Objective   Physical Exam   Constitutional: She is oriented to person, place, and time. She appears well-developed and well-nourished.   Neurological: She is alert and oriented to person, place, and time.   Skin: Skin is warm. No rash noted.   Psychiatric: She has a normal mood and affect.     Ortho Exam  Ortho Exam  Bilateral lower extremity exam reveals a 2+ pitting edema to bilateral lower extremities.  There is no  erythema pedal pulses are faintly palpable due to edema.  Gross sensations intact there is loss of protective sensation.  Both legs are dry and scaly with flaking skin.  On the left leg there are 2 large black necrotic scabs one measures about 3-1/2 cm in length by 1/2 cm in width the other measures about 2 x 2 I don't appear to be infected other dry and stable  On both heels she also has lesions.  The right heel has a very large dry and crusted callused blackened area that appears to be an unstageable wound at this point and on the left heel there is a full-thickness wound measures about 3 cm in diameter and has sloughed off her all skin around the perimeter.      Assessment/Plan  bilateral lower extremity peripheral arterial disease, bilateral heel wounds, left leg wounds, diabetes, end-stage renal disease  Independent Review of Radiographic Studies:      Laboratory and Other Studies:     Medical Decision Making:        Procedures  Debridement Note    There are no diagnoses linked to this encounter.      Recommendations/Plan:  I am send her and had ABIs for when she can get transportation from the nursing home.  I would like to get a baseline of what her vasculature is to lower extremity now.  I recommend the nursing home apply Santyl to all her wounds daily.  I also want him to do do a dark on her heels and continue with the offloading heel boot.  I'll see her back in a week or 2 see how she's doing at that point, hopefully she's had her ABIs done soon as well.  She sees Dr. Omer for her heart throughout imagine he's been involved in her lower extremities.    No Follow-up on file.  Patient agreeable to call or return sooner for any concerns.

## 2017-01-25 NOTE — MR AVS SNAPSHOT
Linda Snyder   1/25/2017 2:25 PM   Office Visit    Dept Phone:  [phone]   Encounter #:  [id number]    Provider:  Jonathan Schulz DPM   Department:  [facility]                Your Full Care Plan              Your Updated Medication List          This list is accurate as of: 1/25/17  3:30 PM.  Always use your most recent med list.                albuterol (2.5 MG/3ML) 0.083% nebulizer solution   Commonly known as:  PROVENTIL       aspirin 325 MG tablet       atorvastatin 10 MG tablet   Commonly known as:  LIPITOR       carvedilol 25 MG tablet   Commonly known as:  COREG       clonazePAM 1 MG tablet   Commonly known as:  KlonoPIN       clopidogrel 75 MG tablet   Commonly known as:  PLAVIX       furosemide 20 MG tablet   Commonly known as:  LASIX       isosorbide mononitrate 30 MG 24 hr tablet   Commonly known as:  IMDUR       lisinopril 40 MG tablet   Commonly known as:  PRINIVIL,ZESTRIL       losartan 25 MG tablet   Commonly known as:  COZAAR       metFORMIN 500 MG tablet   Commonly known as:  GLUCOPHAGE       metoprolol tartrate 100 MG tablet   Commonly known as:  LOPRESSOR       NOVOLOG MIX 70/30 (70-30) 100 UNIT/ML injection   Generic drug:  insulin aspart prot-insulin aspart       TRUE METRIX BLOOD GLUCOSE TEST test strip   Generic drug:  glucose blood               Instructions     None    Patient Instructions History      Upcoming Appointments     Visit Type Date Time Department    NEW PATIENT 1/25/2017  2:25 PM [facility]      [employer] Signup     [facility] allows you to send messages to your doctor, view your test results, renew your prescriptions, schedule appointments, and more. To sign up, go to [employer] and click on the Sign Up Now link in the New User? box. Enter your [employer] Activation Code exactly as it appears below along with the last four digits of your Social Security  "Number and your Date of Birth () to complete the sign-up process. If you do not sign up before the expiration date, you must request a new code.    [facility] Activation Code: [email or web address]  Expires: 2017  3:30 PM    If you have questions, you can email [email or web address] or call [phone] to talk to our [facility] staff. Remember, [facility] is NOT to be used for urgent needs. For medical emergencies, dial 911.               Other Info from Your Visit           Allergies     No Known Allergies      Reason for Visit     Wound Check bilateral foot      Vital Signs     Respirations Height Weight Body Mass Index Smoking Status       16 62\" (157.5 cm) 173 lb (78.5 kg) 31.64 kg/m2 Never Smoker         "

## 2017-02-15 NOTE — DISCHARGE INSTRUCTIONS
Chlorhexidine wipes given to patient with verification sheet. Patient/Family member verbalized understanding to all teaching and instruction.Patient instructed on PAT PASS information.  Patient/family member verbalized understanding of instruction/education.

## 2017-02-15 NOTE — PAT
PATIENT WAS BROUGHT BACK TO EXAM ROOM #2 FROM PREADMISSION TESTING VISIT AT APPROXIMATELY 1625.  INITIATED PATIENT'S HEALTH HISTORY AND WAS ABLE TO ENTER MEDICATION LIST THAT WAS SENT FROM [facility].  STAFF THAT ACCOMPANIED PATIENT TO PREADMISSION TESTING REPORTED AT 1645 THAT THEY HAD TO LEAVE BECAUSE THEIR RIDE WAS [place] [employer] AND THEY WERE GOING TO GET LEFT.  PATIENT LEFT PAT FREE OF DISTRESS AND STAFF WAS GIVEN THE NUMBER TO CALL TO MAKE ANOTHER APPOINTMENT FOR PATIENT TO RETURN SO THAT PAT VISIT AND TESTING COULD BE COMPLETED.    PHONED [facility] [phone] AT 1700 AND SPOKE WITH DELIA CAMPBELL LPN. DISCUSSED WITH SHAISTA THAT PATIENT WAS GOING TO HAVE AN UPCOMING PROCEDURE WITH DR SIEGEL AND THAT SHE HAD TESTING ORDERED. LENCHON WAS TOLD WHAT MD HAD ORDERED FOR PATIENT'S UPCOMING PROCEDURE ON 02-.  LPN REPORTED THAT THEY COULD PROBABLY DO PATIENT'S TESTING THERE.  ALSO DISCUSSED WITH LPN THAT THERE WAS A CONSULT NOTE ON THE FRONT OF THE PATIENT'S MED LIST THAT HAD NEW ORDERS/RECOMMENDATIONS ON IT.  RN DISCUSSED WITH LENCHON THAT THIS PAPER WOULD BE FAXED SO THAT ORDERS COULD BE CARRIED OUT.    ALSO REQUESTED THAT LPN SEND PATIENT'S H&P FROM [facility] AND [facility] AND THAT IF THEY WERE ABLE TO DO TESTING, THAT SHE FAX THOSE REPORTS TO PREADMISSION TESTING AS WELL [phone].  LPN STATED THAT SHE WOULD AND SHE WAS ALSO GIVEN CONTACT INFORMATION FOR PREADMISSION TESTING.     FAXED THE CONSULT NOTE FROM 02-15-17 AND PATIENT'S PREOP INSTRUCTIONS TO DELIA CAMPBELL LPN AT [facility] [phone].  CONFIRMATION OF FAX RECEIVED AT [phone].

## 2017-02-15 NOTE — PROGRESS NOTES
Subjective   Patient ID: Linda Snyder is a 71 y.o. female with diabetes who is here for follow-up for a bilateral lower extremity wounds.  When she came last time she had very small wounds to the anterior left leg and both heels.  She comes in today would do would term over 2 leg wounds on the left as well as her heel wound of the left nothing on the right.      History of Present Illness    Review of Systems   Constitutional: Negative for diaphoresis, fever and unexpected weight change.   HENT: Negative for dental problem and sore throat.    Eyes: Negative for visual disturbance.   Respiratory: Negative for shortness of breath.    Cardiovascular: Negative for chest pain.   Gastrointestinal: Negative for abdominal pain, constipation, diarrhea, nausea and vomiting.   Genitourinary: Negative for difficulty urinating and frequency.   Neurological: Negative for headaches.   Hematological: Does not bruise/bleed easily.   All other systems reviewed and are negative.      Past Medical History   Diagnosis Date   • CAD (coronary artery disease)    • CHF (congestive heart failure)    • Diabetes    • Hypertension    • Renal disease    • Sleep apnea         Past Surgical History   Procedure Laterality Date   • Hysterectomy     • Coronary stent placement     • Foot surgery Left      3rd, 4th, 5th MT amputation       No Known Allergies    Objective   Physical Exam   Constitutional: She is oriented to person, place, and time. She appears well-developed and well-nourished.   Neurological: She is alert and oriented to person, place, and time.   Psychiatric: She has a normal mood and affect.     Ortho Exam  Ortho Exam  her right heel wound is completely healed and re-epithelialized and healthy with no issues.  On the left side her anterior leg wounds are much larger, have a very putrid foul odor of necrotic tissue.  The most proximal one is very large measures about 4 cm x 3 cm and probes deep down in the leg.  The base is  100% fibrotic and necrotic.  She has a smaller one distal to that that measures about 2 cm diameter with the same similar appearance.  Both these wounds have significant serous fingers drainage and odor and maceration.  Her heel wound on the left side is much larger and darker and macerated as well. they now enCompass the entire heel.        Assessment/Plan  resolved right heel wound, worsening left lower extremity wounds ×3  Independent Review of Radiographic Studies:      Laboratory and Other Studies:     Medical Decision Making:        Procedures  Debridement Note        Linda was seen today for follow-up and follow-up.    Diagnoses and all orders for this visit:    Wound of ankle, unspecified laterality, initial encounter  -     Anaerobic culture  -     Culture, Routine  -     Gram Stain  -     Case Request; Standing  -     CBC and Differential; Future  -     Comprehensive metabolic panel; Future  -     ECG 12 Lead; Future  -     XR chest 2 vw; Future  -     sodium chloride 0.9 % flush 1-10 mL; Infuse 1-10 mL into a venous catheter As Needed for line care.  -     lactated ringers infusion; Infuse 100 mL/hr into a venous catheter Continuous.  -     Case Request    Wound, open, foot, unspecified laterality, initial encounter  -     Anaerobic culture  -     Culture, Routine  -     Gram Stain    Abnormal finding of blood chemistry   -     CBC and Differential; Future    Other orders  -     Follow Anesthesia Guidelines / Standing Orders; Future  -     Provide instructions to patient regarding NPO status  -     Obtain informed consent  -     Clorhexidine skin prep  -     Verify NPO Status; Standing  -     Verify informed consent; Standing  -     SOHEILA hose- To be placed on patient in pre-op; Standing  -     SCD (sequential compression device)- to be placed on patient in Pre-op; Standing  -     Obtain informed consent (if not collected inpatient or PAT); Standing  -     Insert Peripheral IV; Standing  -     Saline Lock  & Maintain IV Access; Standing        Recommendations/Plan:  I took cultures of the most proximal large deep anterior tibial wound and I will send most to microbiology.  I explained her that these wounds have worsened significantly and my recommendation would be debridement and evaluation in the operating room.  I explained her that the leg wounds are also very close to her tibia and could result in bone infection or deep abscess.  I explained her and her  that this could potentially be from them occluding her with all the due to her but I don't know for sure.  I applied Betadine wet-to-dry dressings today and that's what I want applied until she has surgery.,  Try to get her scheduled for surgery the end of this week or early next week to try to get these debrided and irrigated in hopes to reveal any further spread her worsening.  I think she may end up needing a wound VAC on the anterior leg and potentially the heel as well.  When I took the cultures the anterior leg: Probes fairly deep musculature and that's concerning to me.  I discussed all the risks versus benefits and potential complications of the procedures with her.  She expresses understanding.  She agrees to proceed.  She is artery had multiple digital amputations and partial ray amputations on the left side shows she is well aware of the potential for limb loss.    Return in about 3 weeks (around 3/8/2017).  Patient agreeable to call or return sooner for any concerns.

## 2017-02-22 NOTE — ANESTHESIA PROCEDURE NOTES
Peripheral Block    Patient location during procedure: post-op  Start time: 2/22/2017 10:31 AM  Stop time: 2/22/2017 10:48 AM  Reason for block: at surgeon's request and post-op pain management  Performed by  CRNA: STERLING CHRISTIANSON  Preanesthetic Checklist  Completed: patient identified, site marked, surgical consent, pre-op evaluation, timeout performed, IV checked, risks and benefits discussed and monitors and equipment checked  Peripheral Block Prep:  Sterile barriers:cap, gloves, mask and sterile barriers  Prep: ChloraPrep  Patient monitoring: blood pressure monitoring, continuous pulse oximetry and EKG  Peripheral Procedure  Guidance:ultrasound guided and nerve stimulator  Images:still images not obtained  Laterality:leftBlock Type:popliteal  Injection Technique:single-shotNeedle Type:echogenic  Needle Gauge:20 G    Medications  Comment:10 ml 2% lidocaine, 20 ml 0.25% bupivacaine with 5 mg pf decadron   Amount of fentanyl injected (mL): mls.  Post Assessment  Patient Tolerance:comfortable throughout block  Complications:no  Additional Notes  Dorsiflexion @ 1 ma, disappeared at 0.5 ma.

## 2017-02-22 NOTE — ANESTHESIA PREPROCEDURE EVALUATION
Anesthesia Evaluation     Patient summary reviewed and Nursing notes reviewed   history of anesthetic complications: PONV  NPO Status: > 8 hours   Airway   Mallampati: I  TM distance: >3 FB  Neck ROM: full  no difficulty expected  Dental    (+) upper dentures    Pulmonary    (+) sleep apnea, wheezes,   Cardiovascular   Exercise tolerance: poor (<4 METS)    ECG reviewed  PT is on anticoagulation therapy  Patient on routine beta blocker and Beta blocker given within 24 hours of surgery  Rhythm: irregular  Rate: normal    (+) hypertension, CAD, CABG, CHF,     ROS comment: CONCLUSIONS:   1. Technically very limited study.   2. Mild left ventricular hypertrophy with mild reduction in LV systolic   function (EF 45%).   3. Severe left atrial enlargement with moderate to severe elevation of left   ventricular end-diastolic pressures.   4. Thickened mitral valve leaflets with mild to moderate mitral insufficiency.   5. Calcified restricted trileaflet aortic valve with suggestion of moderate   to severe aortic stenosis (_____ area is reduced).   6. Moderate tricuspid insufficiency with a PA systolic of 62 mmHg.   7. Calcified ascending aorta.   8. ?Moderate hypokinesis of the anterior and anterolateral walls from the   midsection to the apex.     EKG AFIB    Neuro/Psych- negative ROS  GI/Hepatic/Renal/Endo    (+) obesity,  chronic renal disease ESRD, diabetes mellitus poorly controlled,     Musculoskeletal     Abdominal    Substance History - negative use     OB/GYN negative ob/gyn ROS         Other   (+) arthritis     ROS/Med Hx Other: Dr Jimenez medical clearance      Phys Exam Other: Edentulous lower                            Anesthesia Plan    ASA 4     regional     intravenous induction   Anesthetic plan and risks discussed with patient.

## 2017-02-22 NOTE — ANESTHESIA PROCEDURE NOTES
Peripheral Block    Patient location during procedure: post-op  Start time: 2/22/2017 10:22 AM  Stop time: 2/22/2017 10:30 AM  Reason for block: at surgeon's request and post-op pain management  Performed by  CRNA: STERLING CHRISTIANSON  Preanesthetic Checklist  Completed: patient identified, site marked, surgical consent, pre-op evaluation, timeout performed, risks and benefits discussed and monitors and equipment checked  Peripheral Block Prep:  Sterile barriers:cap, gloves, mask and sterile barriers  Prep: ChloraPrep  Patient monitoring: blood pressure monitoring, continuous pulse oximetry and EKG  Peripheral Procedure  Guidance:ultrasound guided  Images:still images not obtained  Laterality:leftBlock Type:adductor canal block  Injection Technique:single-shotNeedle Type:echogenic  Needle Gauge:20 G  Catheter size: 20g.  Medications  Comment:Pf decadron 5 mg  Local Injected:bupivacaine 0.25% without epinephrine Local Amount Injected:20 (ml)mL  Post Assessment  Patient Tolerance:comfortable throughout block  Complications:no

## 2017-02-22 NOTE — ANESTHESIA POSTPROCEDURE EVALUATION
Patient: Linda Snyder    Procedure Summary     Date Anesthesia Start Anesthesia Stop Room / Location    02/22/17 1057   [facility] / [facility]       Procedure Diagnosis Surgeon Provider    Left leg and heel wound debridements with grafting and possible wound VAC application. (Left Foot) Wound of ankle, unspecified laterality, initial encounter  (Wound of ankle, unspecified laterality, initial encounter [S91.009A]) KAREN Emerson CRNA          Anesthesia Type: regional  Last vitals  /49 (02/22/17 1052)    Temp      Pulse 84 (02/22/17 1054)   Resp      SpO2 100 % (02/22/17 1050)      Post Anesthesia Care and Evaluation    Patient location during evaluation: PACU  Patient participation: complete - patient participated  Level of consciousness: awake and alert  Pain score: 0  Pain management: satisfactory to patient  Airway patency: patent  Anesthetic complications: No anesthetic complications  PONV Status: none  Cardiovascular status: acceptable and stable  Respiratory status: acceptable and room air  Hydration status: acceptable

## 2017-03-07 NOTE — PROGRESS NOTES
Subjective   Patient ID: Linda Snyder is a 71 y.o. female STATUS POST:  Post-op of the Left Leg (02/22/17 1.Left leg wound irrigation and debridement of 2 separate wounds on the left leg that measured 3.5 x 1.7 and 1.5 x 1.1. 2. Left foot wound irrigation and debridement. The wounds measured 4.5 x 5.5. 3. Left foot and leg wound graft application. ) and Post-op of the Left Foot   she comes in today with her dressing clean dry and intact with her caregiver from the nursing home.  She has oxygen intake intact and she is nonweightbearing in a wheelchair.    History of Present Illness    Review of Systems   Constitutional: Negative for fever.   HENT: Negative for voice change.    Eyes: Negative for visual disturbance.   Respiratory: Negative for shortness of breath.    Cardiovascular: Negative for chest pain.   Gastrointestinal: Positive for diarrhea. Negative for abdominal distention and abdominal pain.   Genitourinary: Negative for dysuria.   Musculoskeletal: Negative for arthralgias, gait problem and joint swelling.   Skin: Negative for rash.   Neurological: Negative for speech difficulty.   Hematological: Does not bruise/bleed easily.   Psychiatric/Behavioral: Negative for confusion.       Past Medical History   Diagnosis Date   • Arthritis    • CAD (coronary artery disease)    • CHF (congestive heart failure)    • Diabetes      USES INSULIN, HX OF ALSO BEING ON PO MEDS   • Dialysis patient      PATIENT REPORTS HAS NOT REQUIRED THIS FOR SEVERAL WEEKS NOW   • Hypertension    • Oxygen dependent    • PONV (postoperative nausea and vomiting)    • Renal disease    • Sleep apnea      PATIENT WEARS A BIPAP HS   • Uses nebulizer and inhaler at home         Past Surgical History   Procedure Laterality Date   • Hysterectomy     • Coronary stent placement     • Foot surgery Left      3rd, 4th, 5th MT amputation   • Foot irrigation, debridement and repair Left 2/22/2017     Procedure: Left leg and heel wound  debridements with grafting and possible wound VAC application.;  Surgeon: Jonathan Schulz DPM;  Location: [facility] OR;  Service:        Allergies   Allergen Reactions   • Floraquin [Iodoquinol] Itching   • Niacin And Related Itching       Objective   Physical Exam   Constitutional: She is oriented to person, place, and time. She appears well-developed and well-nourished.   HENT:   Head: Normocephalic and atraumatic.   Eyes: EOM are normal. Pupils are equal, round, and reactive to light.   Neck: Normal range of motion.   Cardiovascular: Normal rate.    Pulmonary/Chest: Effort normal.   Abdominal: Soft. Bowel sounds are normal.   Musculoskeletal: Normal range of motion.   Neurological: She is alert and oriented to person, place, and time. She has normal reflexes.   Skin: Skin is warm.   Psychiatric: She has a normal mood and affect. Her behavior is normal. Judgment and thought content normal.     Ortho Exam  Ortho Exam  Her anterior midshaft tibia wound is now larger than it was during surgery.  It measures 3.5 x 2.  Still full-thickness down to deep musculature and fascia.  There is some slight serosanguineous and yellowish drainage from it.  The base is a mixture of 50-50 slough, fibrous tissue, granulation tissue.  The wound that's just distal and lateral to this is dry with a stable as sure from the graft.  Her left heel wound is also dry and stable with graft intact.  The wound toe with graft intact measures 3.5 x 3.  She does have some residual lower extremity edema more so than prior to surgery.      Assessment/Plan  status post left heel and leg debridement with grafting, status post left leg wound I&D  Independent Review of Radiographic Studies:      Laboratory and Other Studies:     Medical Decision Making:        Procedures excisional wound debridement was performed of the anterior tibial wound measured 3.5 x 2.  Debridement was full-thickness with a sharp curette removing a layer of slough and fibrin tissue  down into the deep musculature and fascia of the anterior lateral leg.  [] No procedures were performed in office today.    Linda was seen today for post-op and post-op.    Diagnoses and all orders for this visit:    Edema of extremity of unknown cause    Wound, open, foot, left, subsequent encounter        Recommendations/Plan:  She has an appointment with infectious disease and is to follow-up with him for further antibiotic treatment.  I discussed with her the difficulty with the leg wound and it seems to be predicated by her swelling.  When she significantly swollen with fluid on her legs it appears much larger than when her fluid is down.  I had debrided the wound today and applied Kareen to the midshaft tibia wound and then want him to begin applying Santyl and a dry sterile dressing to her slightly more distal and lateral leg wound and heel wound.  I do not want this occluded with any type of duty arm or non-adherent dressing.  Distal this applied and 4 x 4's and Erlinda lightly laid onto her leg.  She is to continue to float her heel and nonweightbearing to the left lower extremity.    Return in about 2 weeks (around 3/21/2017).  Patient agreeable to call or return sooner for any concerns.

## 2017-04-04 NOTE — PROGRESS NOTES
Subjective   Patient ID: Linda Snyder is a 71 y.o. female   Follow-up of the Left Lower Leg (Left heel ) and Wound Check   she is now roughly 6 weeks out from left leg and foot wound debridements and grafting.  I have not seen her now in a month.  She states she all of a sudden had colon cancer come out of nowhere and is been in the hospital for that.  She comes in today with a no significant dressings on her left lower extremity.  She has a foam heel boots on both sides.  She says her right side is okay but I can look at it I want.  On the left side she is asking if she can walk with a Darco shoe and get her leg wet.    History of Present Illness    Review of Systems   Constitutional: Negative for diaphoresis, fever and unexpected weight change.   HENT: Negative for dental problem and sore throat.    Eyes: Negative for visual disturbance.   Respiratory: Negative for shortness of breath.    Cardiovascular: Negative for chest pain.   Gastrointestinal: Negative for abdominal pain, constipation, diarrhea, nausea and vomiting.   Genitourinary: Negative for difficulty urinating and frequency.   Musculoskeletal: Positive for arthralgias.   Neurological: Negative for headaches.   Hematological: Does not bruise/bleed easily.   All other systems reviewed and are negative.      Past Medical History:   Diagnosis Date   • Arthritis    • CAD (coronary artery disease)    • CHF (congestive heart failure)    • Diabetes     USES INSULIN, HX OF ALSO BEING ON PO MEDS   • Dialysis patient     PATIENT REPORTS HAS NOT REQUIRED THIS FOR SEVERAL WEEKS NOW   • Hypertension    • Oxygen dependent    • PONV (postoperative nausea and vomiting)    • Renal disease    • Sleep apnea     PATIENT WEARS A BIPAP HS   • Uses nebulizer and inhaler at home         Past Surgical History:   Procedure Laterality Date   • CORONARY STENT PLACEMENT     • FOOT IRRIGATION, DEBRIDEMENT AND REPAIR Left 2/22/2017    Procedure: Left leg and heel wound  debridements with grafting and possible wound VAC application.;  Surgeon: Jonathan Schulz DPM;  Location: [facility];  Service:    • FOOT SURGERY Left     3rd, 4th, 5th MT amputation   • HYSTERECTOMY         Allergies   Allergen Reactions   • Floraquin [Iodoquinol] Itching   • Niacin And Related Itching         Objective   Physical Exam   Constitutional: She is oriented to person, place, and time. She appears well-developed and well-nourished.   HENT:   Head: Normocephalic and atraumatic.   Eyes: EOM are normal. Pupils are equal, round, and reactive to light.   Neck: Normal range of motion.   Cardiovascular: Normal rate.    Pulmonary/Chest: Effort normal.   Abdominal: Soft. Bowel sounds are normal.   Musculoskeletal: Normal range of motion.   Neurological: She is alert and oriented to person, place, and time. She has normal reflexes.   Skin: Skin is warm.   Psychiatric: She has a normal mood and affect. Her behavior is normal. Judgment and thought content normal.     Ortho Exam  Ortho Exam  All wounds on the left lower extremity is significantly deteriorated.  There is 2+ pitting edema to the left lower extremity.  On the anterior mid tibia the wound measures 4.5 x 2.  It has a mixture of granulation tissue and fibrous tissue.  It does appear to be more shallow and have less depth than the last time I saw it.  She has a new wound across anterior ankle now that the 100% fibrotic and measures 4 x 2.  She has multiple wounds now along the lateral leg over the fibula one that measures about 3 x 1.5 and one just proximal to this measures one by one.  Her heel is 100% black and necrotic and draining now measures 5 x 4.  All of her wounds are showing very little signs of healing and health at this time.    Assessment/Plan  multiple left lower extremity wounds, diabetes, peripheral arterial disease, end-stage renal disease  Independent Review of Radiographic Studies:      Laboratory and Other Studies:     Medical Decision  Making:        Procedures I debrided the anterior tibial wound with a sharp curette and 4 x 4's removing all the slough and fibrous tissue.  I was able to create a healthy bleeding wound bed with this excisional debridement down into subcutaneous tissue layer of the wound measured 4.5 x 2.  I do not debride any other remaining wounds today.  I then applied an Unna boot to the left lower extremity from the knee day on.  [] No procedures were performed in office today.    Linda was seen today for wound check and follow-up.    Diagnoses and all orders for this visit:    Wound, open, foot, left, subsequent encounter    Wound, open, foot, unspecified laterality, initial encounter    Wound of ankle, unspecified laterality, initial encounter    Edema of extremity of unknown cause        Recommendations/Plan:  I discussed with her as well as the nursing staff with her that her wounds have a definitely deteriorated and that if we don't do something about them especially her heel soon and she ends up with calcaneal osteomyelitis she most likely to calm to an amputation.  I explained her that a good way to offload the heel will be with external fixation but I prefer not to do that with her because I am afraid that would be very difficult for her to handle.  I given instructions for them to leave the Unna boot on until next week.  I'll see her back then and reassess her wounds but most likely will need to return to the operating room again for debridement and grafting.  I did ask the nursing staff with her about [employer] availability she states that they can get [employer] wound VAC therapy needed so I think that most likely will be our plan for the heel.  Return in about 1 week (around 4/11/2017).  Patient agreeable to call or return sooner for any concerns.

## 2017-04-13 NOTE — PROGRESS NOTES
Subjective   Patient ID: Linda Snyder is a 71 y.o. female   Follow-up of the Left Foot and Wound Check   she is here for follow-up for her multiple left leg wounds.  She had an Unna boot placed last week and states that's been left on and not taken off.  There still saying that she may go home this week or next week but no one has a definitive date.    History of Present Illness    Review of Systems   Constitutional: Negative for diaphoresis, fever and unexpected weight change.   HENT: Negative for dental problem and sore throat.    Eyes: Negative for visual disturbance.   Respiratory: Negative for shortness of breath.    Cardiovascular: Negative for chest pain.   Gastrointestinal: Negative for abdominal pain, constipation, diarrhea, nausea and vomiting.   Genitourinary: Negative for difficulty urinating and frequency.   Neurological: Negative for headaches.   Hematological: Does not bruise/bleed easily.   All other systems reviewed and are negative.      Past Medical History:   Diagnosis Date   • Arthritis    • CAD (coronary artery disease)    • CHF (congestive heart failure)    • Diabetes     USES INSULIN, HX OF ALSO BEING ON PO MEDS   • Dialysis patient     PATIENT REPORTS HAS NOT REQUIRED THIS FOR SEVERAL WEEKS NOW   • Hypertension    • Oxygen dependent    • PONV (postoperative nausea and vomiting)    • Renal disease    • Sleep apnea     PATIENT WEARS A BIPAP HS   • Uses nebulizer and inhaler at home         Past Surgical History:   Procedure Laterality Date   • CORONARY STENT PLACEMENT     • FOOT IRRIGATION, DEBRIDEMENT AND REPAIR Left 2/22/2017    Procedure: Left leg and heel wound debridements with grafting and possible wound VAC application.;  Surgeon: Jonathan Schulz DPM;  Location: [facility] OR;  Service:    • FOOT SURGERY Left     3rd, 4th, 5th MT amputation   • HYSTERECTOMY         Allergies   Allergen Reactions   • Floraquin [Iodoquinol] Itching   • Niacin And Related Itching         Objective    Physical Exam   Constitutional: She is oriented to person, place, and time. She appears well-developed and well-nourished.   HENT:   Head: Normocephalic and atraumatic.   Eyes: EOM are normal. Pupils are equal, round, and reactive to light.   Neck: Normal range of motion.   Cardiovascular: Normal rate.    Pulmonary/Chest: Effort normal.   Abdominal: Soft. Bowel sounds are normal.   Musculoskeletal: Normal range of motion.   Neurological: She is alert and oriented to person, place, and time. She has normal reflexes.   Skin: Skin is warm.   Psychiatric: She has a normal mood and affect. Her behavior is normal. Judgment and thought content normal.     Ortho Exam  Ortho Exam  Left Lower extremity exam:  All wounds on the left lower extremity is significantly deteriorated. There is 2+ pitting edema to the left lower extremity. On the anterior mid tibia the wound measures 4.5 x 2. It has a mixture of granulation tissue and fibrous tissue. It does appear to be more shallow and have less depth than the last time I saw it. She has a new wound across anterior ankle now that the 100% fibrotic and measures 4 x 2. She has multiple wounds now along the lateral leg over the fibula one that measures about 3 x 1.5 and one just proximal to this measures one by one. Her heel is 100% black and necrotic and draining now measures 5 x 4. All of her wounds are showing very little signs of healing and health at this time  Assessment/Plan  multiple left lower extremity wounds, diabetes, end-stage renal disease on hemodialysis, PAD  Independent Review of Radiographic Studies:      Laboratory and Other Studies:     Medical Decision Making:        Procedures I performed full thickness excisional debridement of just the heel wound today.  This wound measures 4 x 5 and is on a percent necrotic there is a small area medially where the necrotic portion had sloughed off underneath this there is brown yellowish necrotic nonviable tissue down to  bone.  Debridement was performed down into the deep muscle and fascial layer with pickups and scissors and knife.  Bleeding was noted with debridement.  After this was performed an Unna boot was placed after Kareen was placed on all the wounds.  [] No procedures were performed in office today.    There are no diagnoses linked to this encounter.      Recommendations/Plan:  She needs to be taken back to the operating room for a more thorough wound debridement and now at this point needs portion of the calcaneus removed due to the exposed bone and probable osteo-.  I discussed this with her briefly last time and urged her that this really needs to be done now.  I explained her that she continues to develop increasing calcaneal bone infection and her likelihood of amputations much higher.  I like to try to get the process started to get her a wound VAC so that we could have a wound VAC here at the hospital and she could have an outpatient procedure done and the VAC applied and then home health take over VAC changes at that point if she is discharged but right now she still does not know when she'll get discharged from the nursing home.  The nursing home uses a different company for wound vacs and that always tends to pose an issue with the patient being in a nursing home and then transferred out had occasions of patient's being without VAC therapy for weeks and I explained that I do not want this to happen to her.  Her nurse with her today is supposed to talk with social work and call back tomorrow so that we can help to devise a plan.  I would like to be a get nail me in the operating room in the back placed by next week at the latest.  For now want him to keep her in the Unna boot and not remove it.    No Follow-up on file.  Patient agreeable to call or return sooner for any concerns.

## 2017-04-14 NOTE — PAT
SPOKE WITH CRYSTAL DESHAUN LPN AT [facility] REGARDING PATIENT'S PREOP INSTRUCTIONS AND HEALTH HISTORY.  LPN REPORTED THAT SHE WOULD FAX PATIENT'S MEDICATION LIST AND H&P TO PREADMISSION TESTING. LPN ALSO REPORTED THAT THE PATIENT HAS HAD C-DIFF RECENTLY AND WAS TREATED WITH VANCOMYCIN.  PATIENT IS NO LONGER ON ANTIBIOTICS BUT [facility] DID REPORT THAT THE PATIENT HAD NOT BEEN RETESTED FOR C-DIFF EVEN THOUGH SHE NO LONGER SYMPTOMATIC.  OR  NOTIFIED, AS PATIENT WAS GIVEN A 0600 ARRIVAL TIME.      PATIENT'S SON AND THE STAFF AT [facility] REPORTED THAT PATIENT WAS ALERT AND ORIENTED AND WAS ABLE TO SIGN HER OWN CONSENTS.  SON REPORTED THAT NO FAMILY WOULD BE WITH PATIENT THE DAY OF PROCEDURE.      RECEIVED RECORDS FROM [facility] AT APPROXIMATELY 1615.  NOTED IN THAT HISTORY THAT PT HAD HX ESBL AND VRE.  OR NOTIFIED AS WELL AS INFECTION CONTROL AT [facility].  SPOKE AGAIN WITH CRYSTAL DESHAUN LPN WHO CONFIRMED THAT PATIENT TAKES BOTH PLAVIX AND COUMADIN DAILY BUT THAT THE COUMADIN HAD BEEN HELD PER PREOP ORDERS OF MD SINCE 04-13-17.  INSTRUCTIONS FAXED TO CRYSTAL DESHAUN LPN AT [facility] WITH CONFIRMATION RECEIVED.

## 2017-04-17 PROBLEM — E11.9 DIABETES (HCC): Status: ACTIVE | Noted: 2017-01-01

## 2017-04-17 PROBLEM — I10 HTN (HYPERTENSION): Status: ACTIVE | Noted: 2017-01-01

## 2017-04-17 PROBLEM — S91.309A WOUND, OPEN, FOOT: Status: ACTIVE | Noted: 2017-01-01

## 2017-04-17 PROBLEM — C80.1 CANCER (HCC): Status: ACTIVE | Noted: 2017-01-01

## 2017-04-17 PROBLEM — I25.10 CAD (CORONARY ARTERY DISEASE): Status: ACTIVE | Noted: 2017-01-01

## 2017-04-17 PROBLEM — Z99.2 ESRD (END STAGE RENAL DISEASE) ON DIALYSIS (HCC): Status: ACTIVE | Noted: 2017-01-01

## 2017-04-17 PROBLEM — Z79.01 CHRONIC ANTICOAGULATION: Status: ACTIVE | Noted: 2017-01-01

## 2017-04-17 PROBLEM — I21.4 NSTEMI (NON-ST ELEVATED MYOCARDIAL INFARCTION) (HCC): Status: ACTIVE | Noted: 2017-01-01

## 2017-04-17 PROBLEM — I48.91 ATRIAL FIBRILLATION (HCC): Status: ACTIVE | Noted: 2017-01-01

## 2017-04-17 PROBLEM — C18.9 ADENOCARCINOMA OF COLON (HCC): Status: ACTIVE | Noted: 2017-01-01

## 2017-04-17 PROBLEM — Z99.2 DIALYSIS PATIENT (HCC): Status: ACTIVE | Noted: 2017-01-01

## 2017-04-17 PROBLEM — D64.9 ANEMIA: Status: ACTIVE | Noted: 2017-01-01

## 2017-04-17 PROBLEM — N18.6 ESRD (END STAGE RENAL DISEASE) ON DIALYSIS (HCC): Status: ACTIVE | Noted: 2017-01-01

## 2017-04-17 NOTE — NURSING NOTE
To diaysis   Dr zeng here   Informed  Her  Re   Troponin  afib  abd  Wound  Drainage    Pressure  On   Coccyx   Heel  Leg  Wounds

## 2017-04-17 NOTE — NURSING NOTE
Called AIME Calloway, House Supervisor, regarding hospitalist consult.  Dr. Schulz states he has talked to hospitalist earlier, no new orders received.

## 2017-04-17 NOTE — PLAN OF CARE
Problem: Perioperative Period (Adult)  Goal: Signs and Symptoms of Listed Potential Problems Will be Absent or Manageable (Perioperative Period)    04/17/17 0746   Perioperative Period   Problems Present (Perioperative Period) none

## 2017-04-17 NOTE — CONSULTS
"Nephrology Consultation    Referring Provider:   Jonathan Schulz DPM    Reason for Consultation:    ESRD and associated problems       Subjective     Chief complaint No chief complaint on file.      History of present illness:      Patient is 71-year-old white female with multiple medical problems as listed below in the past medical history who was brought in for surgical intervention of left leg.  She missed her dialysis this morning and for the procedure she was noted to have increased blood sugars and high potassium.  She was finally admitted for further evaluation and treatment.  During the process he was also noted to have increased troponin.    Currently patient is laying in the bed awake alert and interactive denies any significant complaints, please see the review of system as noted below.    Past Medical History:   Diagnosis Date   • Afib    • Anemia    • Arthritis    • CAD (coronary artery disease)    • Cancer     HX COLON MASS WITH RESECTION   • CHF (congestive heart failure)     DIASTOLIC   • Decreased mobility     [facility] MOTHER IS BEDFAST AND REQUIRES ASSISTANCE WITH ALL TRANSFERS   • Diabetes     USES INSULIN, HX OF ALSO BEING ON PO MEDS   • Dialysis patient     [facility] DIALYSIS ON MONDAY WEDNESDAY FRIDAY   • Full dentures    • High cholesterol    • Hypertension    • Oxygen dependent    • PONV (postoperative nausea and vomiting)     [facility] MINIMAL   • Renal disease     END STAGE   • Sleep apnea     PATIENT WEARS A BIPAP HS   • Uses nebulizer and inhaler at home        Past Surgical History:   Procedure Laterality Date   • CARDIAC SURGERY      [facility] HX OF CARDIAC CATH WITH STENT PLACEMENT   • COLON SURGERY      [facility] REPORTED PATIENT HAD A BOWEL RESECTION APPROXIMATELY 3 WEEKS AGO FOR NECROTIC BOWEL.   • COLONOSCOPY     • CORONARY STENT PLACEMENT      [facility] \"SEVERAL YEARS AGO, NOT SURE HOW MANY STENTS SHE HAS\"   • ENDOSCOPY     • FOOT IRRIGATION, " DEBRIDEMENT AND REPAIR Left 2/22/2017    Procedure: Left leg and heel wound debridements with grafting and possible wound VAC application.;  Surgeon: Jonathan Schulz DPM;  Location: [facility];  Service:    • FOOT SURGERY Left     3rd, 4th, 5th MT amputation   • HYSTERECTOMY     • OTHER SURGICAL HISTORY      [facility] DIALYSIS PORT IN UPPER RIGHT CHEST WALL.   • WISDOM TOOTH EXTRACTION         Family History   Problem Relation Age of Onset   • Diabetes Mother    • Diabetes Sister    • Diabetes Brother    • Hypertension Other    • Hyperlipidemia Other    • Heart disease Other        negative h/o ESRD     Social History   Substance Use Topics   • Smoking status: Former Smoker     Types: Cigarettes   • Smokeless tobacco: Never Used      Comment: [facility] MINIMAL AND THAT PATIENT SMOKED 30-40- YEARS AGO   • Alcohol use No     Prescriptions Prior to Admission   Medication Sig Dispense Refill Last Dose   • amiodarone (PACERONE) 200 MG tablet Take 100 mg by mouth Daily.   4/16/2017 at 0900   • aspirin 81 MG EC tablet Take 81 mg by mouth Daily.   4/16/2017 at 0900   • atorvastatin (LIPITOR) 10 MG tablet Take 40 mg by mouth Daily.   4/16/2017 at 0900   • B Complex-C-Folic Acid (AMBIKA-ANTOINE) tablet Take 1 tablet by mouth Daily.   4/16/2017 at 0900   • clonazePAM (KlonoPIN) 0.5 MG tablet Take 0.5 mg by mouth 3 (Three) Times a Day As Needed for Seizures.   4/16/2017 at 2000   • clopidogrel (PLAVIX) 75 MG tablet Take 75 mg by mouth Daily.   4/16/2017 at 0900   • insulin detemir (LEVEMIR) 100 UNIT/ML injection Inject 20 Units under the skin Every Night.   4/16/2017 at 0600   • lisinopril (PRINIVIL,ZESTRIL) 5 MG tablet Take 5 mg by mouth Daily.   4/16/2017 at 0900   • metoprolol succinate XL (TOPROL-XL) 25 MG 24 hr tablet Take 12.5 mg by mouth 2 (Two) Times a Day.   4/16/2017 at 0900   • MULTIPLE VITAMIN PO Take 1 tablet by mouth Daily.   4/3/2017   • saccharomyces boulardii (FLORASTOR) 250 MG capsule Take  250 mg by mouth 2 (Two) Times a Day.   4/16/2017 at 0900   • vitamin C (ASCORBIC ACID) 500 MG tablet Take 500 mg by mouth Daily.   4/16/2017 at 0900   • warfarin (COUMADIN) 6 MG tablet Take 6 mg by mouth Daily.   4/13/2017   • Zinc Sulfate 220 (50 ZN) MG tablet Take 220 mg by mouth Daily.   4/16/2017 at 0900   • NOVOLOG MIX 70/30 (70-30) 100 UNIT/ML injection Inject 4 Units under the skin 2 (Two) Times a Day With Meals. [facility] REPORTS PATIENT USES SLIDING SCALE TWICE DAILY   Unknown at Unknown time   • ondansetron (ZOFRAN) 4 MG tablet Take 4 mg by mouth Every 8 (Eight) Hours As Needed for nausea or vomiting.   Unknown at Unknown time   • pitavastatin calcium (LIVALO) 1 MG tablet tablet Take  by mouth Every Night.   Unknown at Unknown time   • Pollen Extracts (PROSTAT PO) Take 30 mL by mouth 3 (Three) Times a Day Before Meals.   Unknown at Unknown time   • TRUE METRIX BLOOD GLUCOSE TEST test strip 1 each by Other route As Needed.  0 Unknown at Unknown time     Allergies:  Floraquin [iodoquinol] and Niacin and related    Review of Systems  Constitutional: Negative for fever and chills, no diaphoresis, positive for fatigue and denies any unexpected weight change.   HENT: Negative for congestion and hearing loss.   Eyes: Negative for redness and visual disturbance.   Respiratory: negative for shortness of breath. Negative for chest pain . Negative for cough and chest tightness.   Cardiovascular: Negative for chest pain and palpitations.   Gastrointestinal: Positive for abdominal distention, she mentioned that the there is some leakage from the surgical site and she was told that it may be infected, she also complains of nonspecific abdominal pain and denies any blood in stool. Denies any constipation or diarrhea.  Endocrine: Negative for cold or heat intolerance.   Genitourinary: Negative for difficulty urinating, dysuria and frequency.  Does have a Manzano catheter  Musculoskeletal: Positive for  "arthralgias, back pain and myalgias.   Skin: Negative for color change, rash and wound.   Neurological: Negative for syncope, new onset weakness or numbness of any extremities. Denies any headaches.   Hematological: Negative for adenopathy. Does not bruise/bleed easily.   Psychiatric/Behavioral: Negative for confusion. The patient is not nervous/anxious.     Objective     Vital Signs  /58 (BP Location: Left arm, Patient Position: Lying)  Pulse 115  Temp 97.9 °F (36.6 °C) (Oral)   Resp 22  Ht 62\" (157.5 cm)  Wt 180 lb 3 oz (81.7 kg)  LMP Comment: HYSTERECTOMY  SpO2 94%  BMI 32.96 kg/m2         I/O this shift:  In: 240 [P.O.:240]  Out: -     Intake/Output Summary (Last 24 hours) at 04/17/17 1725  Last data filed at 04/17/17 1155   Gross per 24 hour   Intake              240 ml   Output                0 ml   Net              240 ml       Physical Exam:     General Appearance:   Alert, cooperative, in no acute distress.     Head:   Normocephalic, without obvious abnormality, atraumatic.     Eyes:       Normal, conjunctivae and sclerae, no icterus, no pallor, corneas clear, PERRLA        Throat:   Oral mucosa dry      Neck:  No adenopathy, supple, trachea midline, no thyromegaly, no carotid bruit, no JVD      Back:   No CVA tenderness on Percussion.     Lungs:    Clear to auscultation and fair air movement noted.      Heart:   Regular rhythm and normal rate, normal S1 and S2. she does have 2/6 systolic ejection murmur        Abdomen:   Obese. Normal bowel sounds, no masses, no organomegaly, soft non-tender, non-distended, no guarding, no rebound tenderness still have bandage at the surgical site        Extremities:  Moves all extremities, no edema, no cyanosis, no redness.     Pulses:  Pulses palpable and equal bilaterally but weak.  Left sided is weaker than the right     Skin:  No bleeding, bruising or rash        Neurologic:  Cranial nerves grossly intact, move all extremities             Results " Review:  Results for orders placed or performed during the hospital encounter of 04/17/17   Comprehensive metabolic panel   Result Value Ref Range    Glucose 543 (C) 74 - 98 mg/dL    BUN 47 (H) 7 - 20 mg/dL    Creatinine 4.60 (H) 0.60 - 1.30 mg/dL    Sodium 134 (L) 137 - 145 mmol/L    Potassium 5.1 3.5 - 5.1 mmol/L    Chloride 98 98 - 107 mmol/L    CO2 24.0 (L) 26.0 - 30.0 mmol/L    Calcium 7.9 (L) 8.4 - 10.2 mg/dL    Total Protein 6.4 6.3 - 8.2 g/dL    Albumin 2.70 (L) 3.50 - 5.00 g/dL    ALT (SGPT) 41 13 - 69 U/L    AST (SGOT) 42 15 - 46 U/L    Alkaline Phosphatase 124 38 - 126 U/L    Total Bilirubin 0.5 0.2 - 1.3 mg/dL    eGFR Non African Amer 9 (L) >60 mL/min/1.73    Globulin 3.7 gm/dL    A/G Ratio 0.7 (L) 1.0 - 2.0 g/dL    BUN/Creatinine Ratio 10.2 7.1 - 23.5    Anion Gap 17.1 mmol/L   CBC Auto Differential   Result Value Ref Range    WBC 12.66 (H) 4.80 - 10.80 10*3/mm3    RBC 3.28 (L) 4.20 - 5.40 10*6/mm3    Hemoglobin 9.5 (L) 12.0 - 16.0 g/dL    Hematocrit 32.1 (L) 37.0 - 47.0 %    MCV 97.9 81.0 - 99.0 fL    MCH 29.0 27.0 - 31.0 pg    MCHC 29.6 (L) 30.0 - 37.0 g/dL    RDW 19.4 (H) 11.5 - 14.5 %    RDW-SD 67.3 (H) 37.0 - 54.0 fl    MPV 12.4 (H) 6.0 - 12.0 fL    Platelets 171 130 - 400 10*3/mm3    Neutrophil % 82.3 (H) 37.0 - 80.0 %    Lymphocyte % 8.8 (L) 10.0 - 50.0 %    Monocyte % 5.9 0.0 - 12.0 %    Eosinophil % 1.5 0.0 - 7.0 %    Basophil % 0.2 0.0 - 2.5 %    Immature Grans % 1.3 (H) 0.0 - 0.6 %    Neutrophils, Absolute 10.41 (H) 2.00 - 6.90 10*3/mm3    Lymphocytes, Absolute 1.12 0.60 - 3.40 10*3/mm3    Monocytes, Absolute 0.75 0.00 - 0.90 10*3/mm3    Eosinophils, Absolute 0.19 0.00 - 0.70 10*3/mm3    Basophils, Absolute 0.02 0.00 - 0.20 10*3/mm3    Immature Grans, Absolute 0.17 (H) 0.00 - 0.06 10*3/mm3    nRBC 0.9 (H) 0.0 - 0.0 /100 WBC   Protime-INR   Result Value Ref Range    Protime 32.6 (H) 9.3 - 12.1 Seconds    INR 2.97 (H) 0.90 - 1.10   Scan Slide   Result Value Ref Range    Anisocytosis Slight/1+  None Seen    Dacrocytes Slight/1+ None Seen    Elliptocytes Slight/1+ None Seen    Poikilocytes Slight/1+ None Seen    Polychromasia Slight/1+ None Seen    WBC Morphology Normal Normal    Platelet Morphology Normal Normal   Troponin   Result Value Ref Range    Troponin I 5.980 (C) 0.000 - 0.034 ng/mL   POC Glucose Fingerstick   Result Value Ref Range    Glucose 483 (C) 70 - 130 mg/dL   POC Glucose Fingerstick   Result Value Ref Range    Glucose 473 (C) 70 - 130 mg/dL   POC Glucose Fingerstick   Result Value Ref Range    Glucose 442 (H) 70 - 130 mg/dL   POC Glucose Fingerstick   Result Value Ref Range    Glucose 473 (C) 70 - 130 mg/dL   POC Glucose Fingerstick   Result Value Ref Range    Glucose 425 (H) 70 - 130 mg/dL   POC Glucose Fingerstick   Result Value Ref Range    Glucose 361 (H) 70 - 130 mg/dL   POC Glucose Fingerstick   Result Value Ref Range    Glucose 339 (H) 70 - 130 mg/dL     Imaging Results (last 72 hours)     Procedure Component Value Units Date/Time    XR Chest 1 View [[id number]] Collected:  04/17/17 0721     Updated:  04/17/17 0723    Narrative:       PROCEDURE: XR CHEST 1 VW-     HISTORY: Pre-Op Evaluation; S91.302D-Unspecified open wound, left foot,  subsequent encounter; R60.0-Localized edema     COMPARISON: February 20, 2017.     FINDINGS: A right internal jugular dialysis catheter is in place with  the tip in the right atrium. The heart is normal in size. The  mediastinum is unremarkable. The patient is status post median  sternotomy and CABG procedure..  There are low lung volumes with  bibasilar atelectasis. There is no pneumothorax. There are no acute  osseous abnormalities.           Impression:       No acute cardiopulmonary process.        This report was finalized on 4/17/2017 7:21 AM by Dawood Britton M.D..              amiodarone 100 mg Oral Daily   aspirin 81 mg Oral Daily   atorvastatin 40 mg Oral Daily   b complex-vitamin c-folic acid 1 tablet Oral Daily   bacitracin       bupivacaine (PF)      clopidogrel 75 mg Oral Daily   insulin aspart 15 Units Subcutaneous TID With Meals   insulin aspart prot-insulin aspart 4 Units Subcutaneous BID With Meals   insulin detemir 20 Units Subcutaneous Nightly   lidocaine      lisinopril 5 mg Oral Daily   metoprolol succinate XL 12.5 mg Oral Q12H   saccharomyces boulardii 250 mg Oral Q12H   warfarin 6 mg Oral Daily       lactated ringers 100 mL/hr Last Rate: 100 mL/hr (04/17/17 1350)       Assessment/Plan         1.   ESRD (Dialysis patient): She is due for her dialysis arrangements have been made and she will be dialyzed today.  2.   Wound, open, foot: Surgical intervention planned that did not go through today will have to be reassessed again tomorrow.  3.   Diabetes: Increased blood sugars noted hyperkalemia likely secondary to increased blood sugar once it's stable lites likely will improve.  It is managed as per the hospitalist service.  4.   CAD (coronary artery disease): Positive troponin noted cardiac evaluation is in progress.  5.   Colon Cancer: Status post surgery I'm not sure at this point if she has followed up with the oncology or not.  6. Hypertension: blood pressures under fairly good control with current medication and will be continued.  7. Obstructive sleep apnea: stable use of CPAP in the past, continue home settings.        Details discussed with the patient as well as family and the hospitalist service.     Rest as ordered    Cruz Chicas MD  04/17/17  5:25 PM    Please note that portions of this note may have been completed with a voice recognition program. Efforts were made to edit the dictations, but occasionally words are mistranscribed.

## 2017-04-17 NOTE — PLAN OF CARE
Problem: Fall Risk (Adult)  Goal: Identify Related Risk Factors and Signs and Symptoms    04/17/17 7009   Fall Risk   Fall Risk: Related Risk Factors age-related changes;gait/mobility problems;history of falls   Fall Risk: Signs and Symptoms presence of risk factors

## 2017-04-17 NOTE — NURSING NOTE
Bedside for insulin administration, updated pt on plan of care, unable to update on times for hospitalist to arrive. Provided additional pillow for comfort, assisted pt to change position to left side lying.

## 2017-04-17 NOTE — NURSING NOTE
1120 - report from Lizzy Colon RN    1155 - Food tray provided, pt tolerating well.    1215 - DAVONTE Raiia from [facility] present to  wound vac from facility since not placed on pt today.     1220 - MD Rivera notified and aware of pt glucose. Orders placed.     1232 - House Supervisor Karine notified of need for bed.     1350 - recheck BS, IV fluids infusing, Karine House Sup update that room is being prepared for pt.

## 2017-04-17 NOTE — H&P
[facility]  History and Physical    04/17/17    Primary Care Physician: Pradeep Monteiro MD    Chief Complaint: Dyspnea    Subjective:  History of Present Illness:  Patient scheduled for surgical intervention today with Dr. Schulz -- planned for left leg and heel wound debridement.  In pre-op, she was noted to be tachycardic in the one-teens.  Per anesthesia, she reported dyspnea and chest pain yesterday, but not today.  She tells me she has only had dyspnea. EKG showed inferior and lateral st depression.  Surgery held, Hospitalist consulted for admission for further evaluation and treatment.    Patient was found to have a troponin greater than 5.  Cardiology has been consulted.        Review of Systems   Constitutional: Negative for fever.   Respiratory: Positive for shortness of breath. Negative for chest tightness.    Gastrointestinal:        Recent colon resection for adenocarcinoma   Skin: Positive for wound.        Left leg/heel       Otherwise, complete ROS performed and negative except as mentioned in the HPI.      Past Medical History:     Past Medical History:   Diagnosis Date   • Adenocarcinoma of colon, s/p recent resection 4/17/2017   • Afib    • Anemia    • Anemia 4/17/2017   • Arthritis    • Atrial fibrillation 4/17/2017   • CAD (coronary artery disease)    • Cancer     HX COLON MASS WITH RESECTION   • CHF (congestive heart failure)     DIASTOLIC   • Chronic anticoagulation 4/17/2017    Warfarin for a fib   • Decreased mobility     SON REPORTS MOTHER IS BEDFAST AND REQUIRES ASSISTANCE WITH ALL TRANSFERS   • Diabetes     USES INSULIN, HX OF ALSO BEING ON PO MEDS   • Dialysis patient     [facility] REPORTS DIALYSIS ON MONDAY WEDNESDAY FRIDAY   • ESRD (end stage renal disease) on dialysis 4/17/2017   • Full dentures    • High cholesterol    • HTN (hypertension) 4/17/2017   • Hypertension    • Oxygen dependent    • PONV (postoperative nausea and vomiting)     SON REPORTS MINIMAL  "  • Renal disease     END STAGE   • Sleep apnea     PATIENT WEARS A BIPAP HS   • Uses nebulizer and inhaler at home        Past Surgical History:   Past Surgical History:   Procedure Laterality Date   • CARDIAC SURGERY      SON REPORTS HX OF CARDIAC CATH WITH STENT PLACEMENT   • COLON SURGERY      [facility] AND REHAB STAFF REPORTED PATIENT HAD A BOWEL RESECTION APPROXIMATELY 3 WEEKS AGO FOR NECROTIC BOWEL.   • COLONOSCOPY     • CORONARY STENT PLACEMENT      SON REPORTS \"SEVERAL YEARS AGO, NOT SURE HOW MANY STENTS SHE HAS\"   • ENDOSCOPY     • FOOT IRRIGATION, DEBRIDEMENT AND REPAIR Left 2/22/2017    Procedure: Left leg and heel wound debridements with grafting and possible wound VAC application.;  Surgeon: Jonathan Schulz DPM;  Location: [facility];  Service:    • FOOT SURGERY Left     3rd, 4th, 5th MT amputation   • HYSTERECTOMY     • OTHER SURGICAL HISTORY      [facility].   • WISDOM TOOTH EXTRACTION         Family History:   family history includes Diabetes in her brother, mother, and sister; Heart disease in her other; Hyperlipidemia in her other; Hypertension in her other.    Social History:   reports that she has quit smoking. Her smoking use included Cigarettes. She has never used smokeless tobacco. She reports that she does not drink alcohol or use illicit drugs.    Medications:  Prescriptions Prior to Admission   Medication Sig Dispense Refill Last Dose   • amiodarone (PACERONE) 200 MG tablet Take 100 mg by mouth Daily.   4/16/2017 at 0900   • aspirin 81 MG EC tablet Take 81 mg by mouth Daily.   4/16/2017 at 0900   • atorvastatin (LIPITOR) 10 MG tablet Take 40 mg by mouth Daily.   4/16/2017 at 0900   • B Complex-C-Folic Acid (AMBIKA-ANTOINE) tablet Take 1 tablet by mouth Daily.   4/16/2017 at 0900   • clonazePAM (KlonoPIN) 0.5 MG tablet Take 0.5 mg by mouth 3 (Three) Times a Day As Needed for Seizures.   4/16/2017 at 2000   • clopidogrel (PLAVIX) " "75 MG tablet Take 75 mg by mouth Daily.   4/16/2017 at 0900   • insulin detemir (LEVEMIR) 100 UNIT/ML injection Inject 20 Units under the skin Every Night.   4/16/2017 at 0600   • lisinopril (PRINIVIL,ZESTRIL) 5 MG tablet Take 5 mg by mouth Daily.   4/16/2017 at 2000   • metoprolol succinate XL (TOPROL-XL) 25 MG 24 hr tablet Take 12.5 mg by mouth Daily.   4/16/2017 at 0900   • MULTIPLE VITAMIN PO Take 1 tablet by mouth Daily.   4/3/2017   • saccharomyces boulardii (FLORASTOR) 250 MG capsule Take 250 mg by mouth 2 (Two) Times a Day.   4/16/2017 at 0900   • vancomycin 50 MG/ML solution Take 125 mg by mouth 2 (Two) Times a Day.   4/16/2017 at 1700   • vitamin C (ASCORBIC ACID) 500 MG tablet Take 500 mg by mouth Daily.   4/16/2017 at 0900   • warfarin (COUMADIN) 6 MG tablet Take 6 mg by mouth Daily.   4/13/2017   • Zinc Sulfate 220 (50 ZN) MG tablet Take 220 mg by mouth Daily.   4/16/2017 at 0900   • NOVOLOG MIX 70/30 (70-30) 100 UNIT/ML injection Inject 4 Units under the skin 2 (Two) Times a Day With Meals. [facility] AND REHAB REPORTS PATIENT USES SLIDING SCALE TWICE DAILY   Unknown at Unknown time   • ondansetron (ZOFRAN) 4 MG tablet Take 4 mg by mouth Every 8 (Eight) Hours As Needed for nausea or vomiting.   Unknown at Unknown time   • pitavastatin calcium (LIVALO) 1 MG tablet tablet Take  by mouth Every Night.   Unknown at Unknown time   • Pollen Extracts (PROSTAT PO) Take 30 mL by mouth 3 (Three) Times a Day Before Meals.   Unknown at Unknown time   • TRUE METRIX BLOOD GLUCOSE TEST test strip 1 each by Other route As Needed.  0 Unknown at Unknown time       Allergies:  Allergies   Allergen Reactions   • Floraquin [Iodoquinol] Itching   • Niacin And Related Itching         Objective:  Vital Signs:   /56 (BP Location: Left arm, Patient Position: Lying)  Pulse 116  Temp 97.9 °F (36.6 °C) (Oral)   Resp 18  Ht 62\" (157.5 cm)  Wt 180 lb 3 oz (81.7 kg)  LMP Comment: HYSTERECTOMY  SpO2 94%  BMI 32.96 " kg/m2    Gen: Alert, appropriate, pleasant and interactive  HEENT: EOMI, ATNC, MMM  Neck: Supple  Heart: S1S2, RRR  Lungs: CTA bilaterally, no wheezes, rales, or rhonchi  Abdomen: Soft, NTND, BS+  Extremities: Warm, well-perfused, + pulses, left foot/leg with drsg c/d/i  Skin: right drain site with green, purulent drainage, no tissue infection.  Left drain site c/d/i.  Midline abdominal incision with steristrips, still intact since 3/24/17, distally, skin on abdomen looks a bit cellulitic  Neuro: A/O x3, speech clear        Results Reviewed:        Results from last 7 days  Lab Units 04/17/17  0646   SODIUM mmol/L 134*   POTASSIUM mmol/L 5.1   CHLORIDE mmol/L 98   TOTAL CO2 mmol/L 24.0*   BUN mg/dL 47*   CREATININE mg/dL 4.60*   CALCIUM mg/dL 7.9*   BILIRUBIN mg/dL 0.5   ALK PHOS U/L 124   ALT (SGPT) U/L 41   AST (SGOT) U/L 42   GLUCOSE mg/dL 543*     Lab Results   Component Value Date    INR 2.97 (H) 04/17/2017    INR 1.35 03/30/2017    INR 3.43 (H) 02/22/2017    PROTIME 32.6 (H) 04/17/2017    PROTIME 14.9 (H) 03/30/2017    PROTIME 37.6 (H) 02/22/2017     Lab Results   Component Value Date    CKTOTAL <20 (L) 12/14/2016    CKMB 0.8 12/14/2016    CKMBINDEX  12/14/2016      Comment:      The relative index is statistically unreliable  if the CK is < or equal to 40 U/L.      TROPONINI 7.550 (C) 04/17/2017         Results from last 7 days  Lab Units 04/17/17  0647   WBC 10*3/mm3 12.66*   HEMOGLOBIN g/dL 9.5*   HEMATOCRIT % 32.1*   PLATELETS 10*3/mm3 171         Results from last 7 days  Lab Units 04/17/17  0650   INR  2.97*       Lab Results   Component Value Date    TSH 2.39 12/12/2016     Lab Results   Component Value Date    CKTOTAL <20 (L) 12/14/2016    CKMB 0.8 12/14/2016    CKMBINDEX  12/14/2016      Comment:      The relative index is statistically unreliable  if the CK is < or equal to 40 U/L.      TROPONINI 7.550 (C) 04/17/2017                  Imaging Results (most recent)     Procedure Component Value Units  Date/Time    XR Chest 1 View [[id number]] Collected:  04/17/17 0721     Updated:  04/17/17 0723    Narrative:       PROCEDURE: XR CHEST 1 VW-     HISTORY: Pre-Op Evaluation; S91.302D-Unspecified open wound, left foot,  subsequent encounter; R60.0-Localized edema     COMPARISON: February 20, 2017.     FINDINGS: A right internal jugular dialysis catheter is in place with  the tip in the right atrium. The heart is normal in size. The  mediastinum is unremarkable. The patient is status post median  sternotomy and CABG procedure..  There are low lung volumes with  bibasilar atelectasis. There is no pneumothorax. There are no acute  osseous abnormalities.           Impression:       No acute cardiopulmonary process.        This report was finalized on 4/17/2017 7:21 AM by Dawood Britton M.D..            I have personally reviewed and interpreted available lab data, radiology studies, and ECG obtained at time of admission.        Assessment and Plan:    Active Problems:    Wound, open, foot    Type 2 diabetes mellitus    NSTEMI (non-ST elevated myocardial infarction)    CAD (coronary artery disease)    ESRD (end stage renal disease) on dialysis    Atrial fibrillation    Chronic anticoagulation    Adenocarcinoma of colon, s/p recent resection    HTN (hypertension)    Anemia        Plan:    ESRD on HD  - Consult Dr. Chicas, HD planned for today    NSTEMI  CAD with hx of MI/stent  - Consult Dr. Omer with cardiology  - serial troponins    Atrial Fibrillation  - amiodarone, metoprolol, warfarin  - Dr. Omer consulted as above    DMII  - accuchecks, basal/bolus/correctional insulin    Left foot and heel wound  - Dr. Schulz to take to OR when medically ready    Colon cancer s/p recent resection    Continue home meds as appropriate    DVT Prophylaxis: warfarin      Bernardino Rivera MD 04/17/17 10:18 PM

## 2017-04-17 NOTE — NURSING NOTE
PT REPORTS PRESSURE ULCER TO RIGHT BUTTOCK, CDI BANDAGE TO THE AREA, PT REPORTS NO PAIN AT THIS TIME.

## 2017-04-18 NOTE — CONSULTS
Referring Provider: Hospitalist service/Dr. Rivera  Reason for Consultation: Multiple left lower extremity wounds    Patient Care Team:  Pradeep Monteiro MD as PCP - General    Chief complaint multiple left lower extremity wounds    Subjective .     History of present illness:  Corrie is a very pleasant 71-year-old diabetic female with many medical comorbidities including severe peripheral arterial disease, CAD, PAD/PVD, end-stage renal disease on hemodialysis.  She was supposed to have an outpatient procedure performed for wound debridements and VAC placement and grafting on her left lower extremity yesterday but preoperatively she was noted to be tachycardic and hypertensive.  She complained shortness of breath.  She was showing EKG changes as well.  Per anesthesia recommendations surgery was canceled and they recommended admission for workup.  The patient was then admitted per Dr. Rivera.  I contacted him yesterday morning and the longer Dr. Rivera also a consult to cardiology for treatment and recommendations.  The patient seen at bedside today states she had a cardiac catheter performed this morning she thinks.  She states they're talking about sending her on to [place] for a heart surgery.  She's not sure exactly which cardiologist saw her and she's not sure what her blood sugar was is morning but she thinks it was better.    Review of Systems  All systems were reviewed and negative except for chief complaint.    History  Past Medical History:   Diagnosis Date   • Adenocarcinoma of colon, s/p recent resection 4/17/2017   • Afib    • Anemia    • Anemia 4/17/2017   • Arthritis    • Atrial fibrillation 4/17/2017   • CAD (coronary artery disease)    • Cancer     HX COLON MASS WITH RESECTION   • CHF (congestive heart failure)     DIASTOLIC   • Chronic anticoagulation 4/17/2017    Warfarin for a fib   • Decreased mobility     SON REPORTS MOTHER IS BEDFAST AND REQUIRES ASSISTANCE WITH ALL TRANSFERS   • Diabetes   "   USES INSULIN, HX OF ALSO BEING ON PO MEDS   • Dialysis patient     [facility] REPORTS DIALYSIS ON MONDAY WEDNESDAY FRIDAY   • ESRD (end stage renal disease) on dialysis 4/17/2017   • Full dentures    • High cholesterol    • HTN (hypertension) 4/17/2017   • Hypertension    • Oxygen dependent    • PONV (postoperative nausea and vomiting)     SON REPORTS MINIMAL   • Renal disease     END STAGE   • Sleep apnea     PATIENT WEARS A BIPAP HS   • Uses nebulizer and inhaler at home    , Past Surgical History:   Procedure Laterality Date   • CARDIAC SURGERY      [facility] REPORTS HX OF CARDIAC CATH WITH STENT PLACEMENT   • COLON SURGERY      [facility] AND REHAB STAFF REPORTED PATIENT HAD A BOWEL RESECTION APPROXIMATELY 3 WEEKS AGO FOR NECROTIC BOWEL.   • COLONOSCOPY     • CORONARY STENT PLACEMENT      SON REPORTS \"SEVERAL YEARS AGO, NOT SURE HOW MANY STENTS SHE HAS\"   • ENDOSCOPY     • FOOT IRRIGATION, DEBRIDEMENT AND REPAIR Left 2/22/2017    Procedure: Left leg and heel wound debridements with grafting and possible wound VAC application.;  Surgeon: Jonathan Schulz DPM;  Location: [facility];  Service:    • FOOT SURGERY Left     3rd, 4th, 5th MT amputation   • HYSTERECTOMY     • OTHER SURGICAL HISTORY      [facility] UPPER RIGHT CHEST WALL.   • WISDOM TOOTH EXTRACTION     , Family History   Problem Relation Age of Onset   • Diabetes Mother    • Diabetes Sister    • Diabetes Brother    • Hypertension Other    • Hyperlipidemia Other    • Heart disease Other    , Social History   Substance Use Topics   • Smoking status: Former Smoker     Types: Cigarettes   • Smokeless tobacco: Never Used      Comment: SON REPORTS MINIMAL AND THAT PATIENT SMOKED 30-40- YEARS AGO   • Alcohol use No   , Prescriptions Prior to Admission   Medication Sig Dispense Refill Last Dose   • amiodarone (PACERONE) 200 MG tablet Take 100 mg by mouth Daily.   4/16/2017 at 0900   • aspirin 81 MG EC tablet Take 81 mg by mouth " Daily.   4/16/2017 at 0900   • atorvastatin (LIPITOR) 10 MG tablet Take 40 mg by mouth Daily.   4/16/2017 at 0900   • B Complex-C-Folic Acid (AMBIKA-ANTOINE) tablet Take 1 tablet by mouth Daily.   4/16/2017 at 0900   • clonazePAM (KlonoPIN) 0.5 MG tablet Take 0.5 mg by mouth 3 (Three) Times a Day As Needed for Seizures.   4/16/2017 at 2000   • clopidogrel (PLAVIX) 75 MG tablet Take 75 mg by mouth Daily.   4/16/2017 at 0900   • insulin detemir (LEVEMIR) 100 UNIT/ML injection Inject 20 Units under the skin Every Night.   4/16/2017 at 0600   • lisinopril (PRINIVIL,ZESTRIL) 5 MG tablet Take 5 mg by mouth Daily.   4/16/2017 at 2000   • metoprolol succinate XL (TOPROL-XL) 25 MG 24 hr tablet Take 12.5 mg by mouth Daily.   4/16/2017 at 0900   • MULTIPLE VITAMIN PO Take 1 tablet by mouth Daily.   4/3/2017   • saccharomyces boulardii (FLORASTOR) 250 MG capsule Take 250 mg by mouth 2 (Two) Times a Day.   4/16/2017 at 0900   • vancomycin 50 MG/ML solution Take 125 mg by mouth 2 (Two) Times a Day.   4/16/2017 at 1700   • vitamin C (ASCORBIC ACID) 500 MG tablet Take 500 mg by mouth Daily.   4/16/2017 at 0900   • warfarin (COUMADIN) 6 MG tablet Take 6 mg by mouth Daily.   4/13/2017   • Zinc Sulfate 220 (50 ZN) MG tablet Take 220 mg by mouth Daily.   4/16/2017 at 0900   • NOVOLOG MIX 70/30 (70-30) 100 UNIT/ML injection Inject 4 Units under the skin 2 (Two) Times a Day With Meals. [facility] AND REHAB REPORTS PATIENT USES SLIDING SCALE TWICE DAILY   Unknown at Unknown time   • ondansetron (ZOFRAN) 4 MG tablet Take 4 mg by mouth Every 8 (Eight) Hours As Needed for nausea or vomiting.   Unknown at Unknown time   • pitavastatin calcium (LIVALO) 1 MG tablet tablet Take  by mouth Every Night.   Unknown at Unknown time   • Pollen Extracts (PROSTAT PO) Take 30 mL by mouth 3 (Three) Times a Day Before Meals.   Unknown at Unknown time   • TRUE METRIX BLOOD GLUCOSE TEST test strip 1 each by Other route As Needed.  0 Unknown at Unknown time   ,  "Scheduled Meds:    amiodarone 100 mg Oral Daily   atorvastatin 40 mg Oral Daily   b complex-vitamin c-folic acid 1 tablet Oral Daily   [MAR Hold] heparin (porcine) 4,000 Units Intracatheter Once   [MAR Hold] insulin aspart 15 Units Subcutaneous TID With Meals   insulin aspart prot-insulin aspart 4 Units Subcutaneous BID With Meals   [MAR Hold] insulin detemir 20 Units Subcutaneous Nightly   metoprolol succinate XL 25 mg Oral Q12H   saccharomyces boulardii 250 mg Oral Q12H   ticagrelor 90 mg Oral BID   warfarin 6 mg Oral Daily   , Continuous Infusions:    sodium chloride 100 mL/hr Last Rate: 100 mL/hr (04/18/17 1056)   , PRN Meds:  •  acetaminophen  •  clonazePAM  •  HYDROcodone-acetaminophen  •  ondansetron  •  sodium chloride  •  sodium chloride  •  [MAR Hold] sodium chloride and Allergies:  Floraquin [iodoquinol] and Niacin and related    Objective     Vital Signs   /54 (BP Location: Left arm, Patient Position: Lying)  Pulse 85  Temp 98.2 °F (36.8 °C) (Oral)   Resp 16  Ht 62\" (157.5 cm)  Wt 181 lb 7 oz (82.3 kg)  LMP Comment: HYSTERECTOMY  SpO2 96%  BMI 33.19 kg/m2    Physical Exam:  She is awake and alert but not always oriented to person place and time.  She does show some signs of dementia.  NAD, AF VSS  PERRLA  Cranial nerves II through XII intact  Abdomen soft nontender  Lungs CTA bilaterally    Her Unna boot still intact to left lower extremity.  Her digits are present outside of the Unna boot.  Capillary refill times less than 5 seconds.  She has movement and motion of her digits.       Results Review: Her heart rate is down under 100 now and in the 90s at the time of my visit.  Her blood pressure is also decreased.  She shows no signs of fever.  All other laboratory values appear to be within normal limits release baseline for her.  Her last blood glucose was 105.  Imaging Results:none    Assessment/Plan   71-year-old diabetic female with multiple other severe medical comorbidities and " multiple left lower extremity wounds.  Active Problems:    Wound, open, foot    Type 2 diabetes mellitus    CAD (coronary artery disease)    ESRD (end stage renal disease) on dialysis    Atrial fibrillation    Chronic anticoagulation    Adenocarcinoma of colon, s/p recent resection    HTN (hypertension)    Anemia    NSTEMI (non-ST elevated myocardial infarction)   I discussed with her today that were basically in a holding pattern as far as anything surgically's concern for her leg.  We discussed that her heart is much more important and everything with that needs to be addressed in her be considered safe and cleared for any type of surgical procedure first.  I'll wait to try to catch Dr. Kan Song later on and discuss what they think is her a best treatment course.  Recommend we continue antibiotics per the primary.  Continue anticoagulation per the primary or cardiology.  We'll keep her dressing to the left lower extremity clean dry and intact for the time being.  She should remain nonweightbearing to the left lower extremity blood flow her heels.  I discussed the patients findings and my recommendations with patient    Jonathan Schulz DPM  04/18/17  12:55 PM

## 2017-04-18 NOTE — NURSING NOTE
Pt has multiple wounds; abdominal incision from previous colon cancer surgery over a month ago per pt, has steristrips with small opening 4.8cm depth with purulent drainage; per pt buttocks and left heel wounds were from previous pressure, unsure of cause of multiple ulcers to vaginal area extending to ischium of left leg, stated had a cifuentes over a month ago and no other trauma or issues; right heel was red on admission but no longer red and just slightly boggy; applied ehob booties after dressings applied to wounds for offloading to wounds; encouraged offloading for patients pressure points and wounds, pt agreed; discussed findings with rn

## 2017-04-18 NOTE — PROGRESS NOTES
"Nephrology Progress Note.    LOS: 0 days    Patient Care Team:  Pradeep Monteiro MD as PCP - General    Chief Complaint:  No chief complaint on file.      Subjective   Patient seen and examined this morning.  Events from last night noted.  Patient denies having any fevers chills.  No nausea or vomiting no abdominal pain.  Denies any chest pain shortness of breath cough or sputum production.  There is no significant edema.   Patient also denies having new onset weakness of numbness of either extremity.  She slept well used her CPAP all night long.      Review of Systems:    The pertinent  ROS was done and it is noted above, rest  was negative.    Objective     Vital Signs  /61 (BP Location: Left arm, Patient Position: Lying)  Pulse 87  Temp 98.6 °F (37 °C) (Oral)   Resp 18  Ht 62\" (157.5 cm)  Wt 181 lb 6.4 oz (82.3 kg)  LMP Comment: HYSTERECTOMY  SpO2 100%  BMI 33.18 kg/m2           Intake/Output Summary (Last 24 hours) at 04/18/17 0905  Last data filed at 04/17/17 2248   Gross per 24 hour   Intake              240 ml   Output             2500 ml   Net            -2260 ml       Physical Exam:    General Appearance: alert, oriented x 3, no acute distress,   HEENT: pupils round and reactive to light, oral mucosa dry, extra occular movements intact.  Neck: supple, no JVD, trachea midline  Lungs: Clear to Auscultation, unlabored breathing effort  Heart: IRRR, normal S1 and S2, no S3, no rub.  She does have 2/6 systolic ejection murmur  Abdomen: soft, non-tender, no palpable bladder, present bowel sounds to auscultation  Extremities: no edema, cyanosis or clubbing.   Neuro: normal speech and mental status, grossly non focal.     Results Review:      Results from last 7 days  Lab Units 04/18/17  0607 04/17/17  0646   SODIUM mmol/L 141 134*   POTASSIUM mmol/L 3.8 5.1   CHLORIDE mmol/L 106 98   TOTAL CO2 mmol/L 27.0 24.0*   BUN mg/dL 20 47*   CREATININE mg/dL 2.60* 4.60*   CALCIUM mg/dL 7.6* 7.9*   BILIRUBIN " mg/dL 0.4 0.5   ALK PHOS U/L 112 124   ALT (SGPT) U/L 53 41   AST (SGOT) U/L 57* 42   GLUCOSE mg/dL 111* 543*       Estimated Creatinine Clearance: 20.3 mL/min (by C-G formula based on Cr of 2.6).      Results from last 7 days  Lab Units 04/18/17  0607   MAGNESIUM mg/dL 2.1               Results from last 7 days  Lab Units 04/18/17  0607 04/17/17  0647   WBC 10*3/mm3 11.09* 12.66*   HEMOGLOBIN g/dL 9.5* 9.5*   PLATELETS 10*3/mm3 196 171         Results from last 7 days  Lab Units 04/18/17  0607 04/17/17  0650   INR  3.22* 2.97*         Imaging Results (last 24 hours)     ** No results found for the last 24 hours. **          amiodarone 100 mg Oral Daily   atorvastatin 40 mg Oral Daily   b complex-vitamin c-folic acid 1 tablet Oral Daily   clopidogrel 75 mg Oral Daily   [MAR Hold] heparin (porcine) 4,000 Units Intracatheter Once   [MAR Hold] insulin aspart 15 Units Subcutaneous TID With Meals   insulin aspart prot-insulin aspart 4 Units Subcutaneous BID With Meals   [MAR Hold] insulin detemir 20 Units Subcutaneous Nightly   metoprolol succinate XL 25 mg Oral Q12H   saccharomyces boulardii 250 mg Oral Q12H   warfarin 6 mg Oral Daily          Medication Review:   Current Facility-Administered Medications   Medication Dose Route Frequency Provider Last Rate Last Dose   • amiodarone (PACERONE) tablet 100 mg  100 mg Oral Daily Bernardino Rivera MD   100 mg at 04/17/17 1754   • atorvastatin (LIPITOR) tablet 40 mg  40 mg Oral Daily Bernardino Rivera MD   40 mg at 04/17/17 1754   • b complex-vitamin c-folic acid (NEPHRO-ANTOINE) tablet 1 tablet  1 tablet Oral Daily Bernardino Rivera MD       • clonazePAM (KlonoPIN) tablet 0.5 mg  0.5 mg Oral TID PRN Bernardino Rivera MD   0.5 mg at 04/17/17 2239   • clopidogrel (PLAVIX) tablet 75 mg  75 mg Oral Daily Bernardino Rivera MD   75 mg at 04/17/17 1755   • [MAR Hold] heparin (porcine) injection 4,000 Units  4,000 Units Intracatheter Once Cruz Chicas MD        • [MAR Hold] insulin aspart (novoLOG) injection 15 Units  15 Units Subcutaneous TID With Meals Bernardino Rivera MD   15 Units at 04/17/17 1802   • insulin aspart prot-insulin aspart (novoLOG 70/30) injection 4 Units  4 Units Subcutaneous BID With Meals Bernardino Rivera MD       • [MAR Hold] insulin detemir (LEVEMIR) injection 20 Units  20 Units Subcutaneous Nightly Bernardino Rivera MD   20 Units at 04/17/17 2239   • metoprolol succinate XL (TOPROL-XL) 24 hr tablet 25 mg  25 mg Oral Q12H Karel Omer MD   25 mg at 04/17/17 2239   • ondansetron (ZOFRAN) tablet 4 mg  4 mg Oral Q8H PRN Bernardino Rivera MD       • saccharomyces boulardii (FLORASTOR) capsule 250 mg  250 mg Oral Q12H Bernardino Rivera MD   250 mg at 04/17/17 2239   • [MAR Hold] sodium chloride 0.9 % bolus 1,000 mL  1,000 mL Intravenous PRN Cruz Chicas MD       • sodium chloride 0.9 % flush 1-10 mL  1-10 mL Intravenous PRN Jonathan Schulz DPM       • [MAR Hold] sodium chloride 0.9 % flush 1-10 mL  1-10 mL Intravenous PRN Bernardino Rivera MD       • warfarin (COUMADIN) tablet 6 mg  6 mg Oral Daily Bernardino Rivera MD   6 mg at 04/17/17 1825       Assessment/Plan        1. ESRD (Dialysis patient): Her dialysis schedule is Monday Wednesday and Friday, will make arrangements.  Her next dialysis is due in the morning.  2. Anemia: H&H has been fairly stable current hemoglobin of 9.5 continue to watch closely if needed we'll transfuse.  3. Wound, open, foot: Surgical intervention planned that did not go through yesterday, Dr. Schulz will reevaluate once she is slightly more stable.  4. Diabetes: Since in the hospital and being treated aggressively it has been under pretty good control.  Continue the sliding scale.  5. CAD (coronary artery disease): Positive troponin noted cardiac evaluation is in progress.  6. NSTEMI (non-ST elevated myocardial infarction): Heart catheter scheduled today.  7.  Adenocarcinoma  of colon, s/p recent resection: Status post surgery I'm not sure at this point if she has followed up with the oncology or not.  8. Hypertension: blood pressures under fairly good control with current medication and will be continued.  9. Obstructive sleep apnea: stable use of CPAP in the past, continue home settings.  She did use it all night long.  10.  Atrial fibrillation: History of currently appears to be in and out of A. fib.  She is on a monitored bed.  11. Chronic anticoagulation: Will need to be watched closely as her INR has increased to greater than 3 today.         Details were discussed with the patient as well as family in the room.  I will also discussed the assessment and plan with the hospitalist service.    Rest as ordered.    Cruz Chicas MD  04/18/17  9:05 AM     Please note that portions of this note may have been completed with a voice recognition program. Efforts were made to edit the dictations, but occasionally words are mistranscribed.

## 2017-04-18 NOTE — CONSULTS
"  Referring Provider: Dr Rivera   Reason for Consultation: Non q Mi     Patient Care Team:  Pradeep Monteiro MD as PCP - General        History of present illness: Patient apparently was noted to have EKG changes during the surgery today.  Was subsequently brought back to the floor.  Subsequent evaluation of her troponin reveals elevation of the same.  Patient does not admit to any significant chest pains or shortness of breath.        Review of Systems   Pertinent items are noted in HPI  Review of Systems      History  #1 CAD-remote myocardial infarction status post stent placement.  Last seen by Dr. Faria 6 months ago told to be okay.    #2 hypertension.    #3 peripheral vascular disease recent intervention done at [facility].    #4.  Dyslipidemia.    #5 diabetes mellitus with poor control.    #6 multiple resistant organism sepsis recurrent.    #7 end stage renal disease on dialysis therapy.    #8 Colon cancer status post removal with nonhealing of the incision site.    #9 debility with bedridden status.    #10 Bed sores      11 atrial fibrillation on rate control therapy.    #12 anti-coagulation  therapy.    Personal history nonsmoker does not drink review struck stenosis and depression function status the patient is minimal to none.    Family history noncontributory.    Review of symptoms has indicated is also has a nonhealing ulcer of the left foot.  Has  difficulty doing any form of activity has some constant oozing of the wound site where the surgery was done for the belly.      Past Surgical History:   Procedure Laterality Date   • CARDIAC SURGERY      [facility] REPORTS HX OF CARDIAC CATH WITH STENT PLACEMENT   • COLON SURGERY      [facility] AND REH[facility] STAFF REPORTED PATIENT HAD A BOWEL RESECTION APPROXIMATELY 3 WEEKS AGO FOR NECROTIC BOWEL.   • COLONOSCOPY     • CORONARY STENT PLACEMENT      [facility] REPORTS \"SEVERAL YEARS AGO, NOT SURE HOW MANY STENTS SHE HAS\"   • ENDOSCOPY     • FOOT IRRIGATION, " DEBRIDEMENT AND REPAIR Left 2/22/2017    Procedure: Left leg and heel wound debridements with grafting and possible wound VAC application.;  Surgeon: Jonathan Schulz DPM;  Location: [facility];  Service:    • FOOT SURGERY Left     3rd, 4th, 5th MT amputation   • HYSTERECTOMY     • OTHER SURGICAL HISTORY      [facility] REPOR[facility] DIALYSIS PORT IN UPPER RIGHT CHEST WALL.   • WISDOM TOOTH EXTRACTION     , Family History   Problem Relation Age of Onset   • Diabetes Mother    • Diabetes Sister    • Diabetes Brother    • Hypertension Other    • Hyperlipidemia Other    • Heart disease Other    , Social History   Substance Use Topics   • Smoking status: Former Smoker     Types: Cigarettes   • Smokeless tobacco: Never Used      Comment: [facility] REPORTS MINIMAL AND THAT PATIENT SMOKED 30-40- YEARS AGO   • Alcohol use No   , Prescriptions Prior to Admission   Medication Sig Dispense Refill Last Dose   • amiodarone (PACERONE) 200 MG tablet Take 100 mg by mouth Daily.   4/16/2017 at 0900   • aspirin 81 MG EC tablet Take 81 mg by mouth Daily.   4/16/2017 at 0900   • atorvastatin (LIPITOR) 10 MG tablet Take 40 mg by mouth Daily.   4/16/2017 at 0900   • B Complex-C-Folic Acid (AMBIKA-ANTOINE) tablet Take 1 tablet by mouth Daily.   4/16/2017 at 0900   • clonazePAM (KlonoPIN) 0.5 MG tablet Take 0.5 mg by mouth 3 (Three) Times a Day As Needed for Seizures.   4/16/2017 at 2000   • clopidogrel (PLAVIX) 75 MG tablet Take 75 mg by mouth Daily.   4/16/2017 at 0900   • insulin detemir (LEVEMIR) 100 UNIT/ML injection Inject 20 Units under the skin Every Night.   4/16/2017 at 0600   • lisinopril (PRINIVIL,ZESTRIL) 5 MG tablet Take 5 mg by mouth Daily.   4/16/2017 at 2000   • metoprolol succinate XL (TOPROL-XL) 25 MG 24 hr tablet Take 12.5 mg by mouth Daily.   4/16/2017 at 0900   • MULTIPLE VITAMIN PO Take 1 tablet by mouth Daily.   4/3/2017   • saccharomyces boulardii (FLORASTOR) 250 MG capsule Take 250 mg by mouth 2 (Two) Times a Day.    4/16/2017 at 0900   • vancomycin 50 MG/ML solution Take 125 mg by mouth 2 (Two) Times a Day.   4/16/2017 at 1700   • vitamin C (ASCORBIC ACID) 500 MG tablet Take 500 mg by mouth Daily.   4/16/2017 at 0900   • warfarin (COUMADIN) 6 MG tablet Take 6 mg by mouth Daily.   4/13/2017   • Zinc Sulfate 220 (50 ZN) MG tablet Take 220 mg by mouth Daily.   4/16/2017 at 0900   • NOVOLOG MIX 70/30 (70-30) 100 UNIT/ML injection Inject 4 Units under the skin 2 (Two) Times a Day With Meals. [facility] RE[facility] REPORTS PATIENT USES SLIDING SCALE TWICE DAILY   Unknown at Unknown time   • ondansetron (ZOFRAN) 4 MG tablet Take 4 mg by mouth Every 8 (Eight) Hours As Needed for nausea or vomiting.   Unknown at Unknown time   • pitavastatin calcium (LIVALO) 1 MG tablet tablet Take  by mouth Every Night.   Unknown at Unknown time   • Pollen Extracts (PROSTAT PO) Take 30 mL by mouth 3 (Three) Times a Day Before Meals.   Unknown at Unknown time   • TRUE METRIX BLOOD GLUCOSE TEST test strip 1 each by Other route As Needed.  0 Unknown at Unknown time   , Scheduled Meds:    amiodarone 100 mg Oral Daily   aspirin 81 mg Oral Daily   atorvastatin 40 mg Oral Daily   b complex-vitamin c-folic acid 1 tablet Oral Daily   clopidogrel 75 mg Oral Daily   heparin (porcine) 4,000 Units Intracatheter Once   insulin aspart 15 Units Subcutaneous TID With Meals   insulin aspart prot-insulin aspart 4 Units Subcutaneous BID With Meals   insulin detemir 20 Units Subcutaneous Nightly   lisinopril 5 mg Oral Daily   metoprolol succinate XL 12.5 mg Oral Q12H   saccharomyces boulardii 250 mg Oral Q12H   warfarin 6 mg Oral Daily   , Continuous Infusions:   , PRN Meds:  clonazePAM  •  ondansetron  •  sodium chloride  •  sodium chloride  •  sodium chloride, Allergies:  Floraquin [iodoquinol] and Niacin and related     Objective     Vital Sign Min/Max for last 24 hours  Temp  Min: 97.9 °F (36.6 °C)  Max: 98.6 °F (37 °C)   BP  Min: 100/46  Max: 114/64   Pulse  Min:  "104  Max: 128   Resp  Min: 18  Max: 22   SpO2  Min: 93 %  Max: 97 %   Flow (L/min)  Min: 2  Max: 2   Weight  Min: 180 lb 3 oz (81.7 kg)  Max: 180 lb 3 oz (81.7 kg)     Flowsheet Rows         First Filed Value    Admission Height  62\" (157.5 cm) Documented at 04/17/2017 1642    Admission Weight  180 lb 3 oz (81.7 kg) Documented at 04/17/2017 1642               Physical Exam:     General Appearance:    Alert, cooperative, in no acute distress   Head:    Normocephalic, without obvious abnormality, atraumatic   Eyes:            Lids and lashes normal, conjunctivae and sclerae normal, no   icterus, no pallor, corneas clear, PERRLA   Ears:    Ears appear intact with no abnormalities noted   Throat:   No oral lesions, no thrush, oral mucosa moist   Neck:   No adenopathy, supple, trachea midline, no thyromegaly, no     carotid bruit, no JVD   Back:     No kyphosis present, no scoliosis present, no skin lesions,       erythema or scars, no tenderness to percussion or                   palpation,   range of motion normal   Lungs:    respite sounds in the bases.      Heart:    Regular rhythm and normal rate, normal S1 and S2, no            murmur, no gallop, no rub, no click   Breast Exam:    Deferred   Abdomen:     Normal bowel sounds, no masses, no organomegaly, soft        non-tender, non-distended, no guarding, no rebound                 tenderness   Genitalia:    Deferred   Extremities:   Moves all extremities well, no edema, no cyanosis, no              redness   Pulses:   very weak pulses bilaterally.     Skin:   No bleeding, bruising or rash   Lymph nodes:   No palpable adenopathy   Neurologic:   Cranial nerves 2 - 12 grossly intact, sensation intact, DTR        present and equal bilaterally       Results Review:   I reviewed the patient's new clinical results.    EKG: Atrial fibrillation with ST segment depression ischemic in the lateral high lateral and the anterior leads.    LAB DATA :           WBC   Date Value Ref " Range Status   04/17/2017 12.66 (H) 4.80 - 10.80 10*3/mm3 Final     RBC   Date Value Ref Range Status   04/17/2017 3.28 (L) 4.20 - 5.40 10*6/mm3 Final     Hemoglobin   Date Value Ref Range Status   04/17/2017 9.5 (L) 12.0 - 16.0 g/dL Final     Hematocrit   Date Value Ref Range Status   04/17/2017 32.1 (L) 37.0 - 47.0 % Final     MCV   Date Value Ref Range Status   04/17/2017 97.9 81.0 - 99.0 fL Final     MCH   Date Value Ref Range Status   04/17/2017 29.0 27.0 - 31.0 pg Final     MCHC   Date Value Ref Range Status   04/17/2017 29.6 (L) 30.0 - 37.0 g/dL Final     RDW   Date Value Ref Range Status   04/17/2017 19.4 (H) 11.5 - 14.5 % Final     RDW-SD   Date Value Ref Range Status   04/17/2017 67.3 (H) 37.0 - 54.0 fl Final     MPV   Date Value Ref Range Status   04/17/2017 12.4 (H) 6.0 - 12.0 fL Final     Platelets   Date Value Ref Range Status   04/17/2017 171 130 - 400 10*3/mm3 Final     Neutrophil %   Date Value Ref Range Status   04/17/2017 82.3 (H) 37.0 - 80.0 % Final     Lymphocyte %   Date Value Ref Range Status   04/17/2017 8.8 (L) 10.0 - 50.0 % Final     Monocyte %   Date Value Ref Range Status   04/17/2017 5.9 0.0 - 12.0 % Final     Eosinophil %   Date Value Ref Range Status   04/17/2017 1.5 0.0 - 7.0 % Final     Basophil %   Date Value Ref Range Status   04/17/2017 0.2 0.0 - 2.5 % Final     Immature Grans %   Date Value Ref Range Status   04/17/2017 1.3 (H) 0.0 - 0.6 % Final     Neutrophils, Absolute   Date Value Ref Range Status   04/17/2017 10.41 (H) 2.00 - 6.90 10*3/mm3 Final     Lymphocytes, Absolute   Date Value Ref Range Status   04/17/2017 1.12 0.60 - 3.40 10*3/mm3 Final     Monocytes, Absolute   Date Value Ref Range Status   04/17/2017 0.75 0.00 - 0.90 10*3/mm3 Final     Eosinophils, Absolute   Date Value Ref Range Status   04/17/2017 0.19 0.00 - 0.70 10*3/mm3 Final     Basophils, Absolute   Date Value Ref Range Status   04/17/2017 0.02 0.00 - 0.20 10*3/mm3 Final     Immature Grans, Absolute   Date  Value Ref Range Status   04/17/2017 0.17 (H) 0.00 - 0.06 10*3/mm3 Final     nRBC   Date Value Ref Range Status   04/17/2017 0.9 (H) 0.0 - 0.0 /100 WBC Final       Lab Results   Component Value Date    GLUCOSE 543 (C) 04/17/2017    BUN 47 (H) 04/17/2017    CREATININE 4.60 (H) 04/17/2017    EGFRIFNONA 9 (L) 04/17/2017    BCR 10.2 04/17/2017    CO2 24.0 (L) 04/17/2017    CALCIUM 7.9 (L) 04/17/2017    ALBUMIN 2.70 (L) 04/17/2017    LABIL2 0.7 (L) 04/17/2017    AST 42 04/17/2017    ALT 41 04/17/2017       Lab Results   Component Value Date    CKTOTAL <20 (L) 12/14/2016    CKMB 0.8 12/14/2016    CKMBINDEX  12/14/2016      Comment:      The relative index is statistically unreliable  if the CK is < or equal to 40 U/L.      TROPONINI 5.980 (C) 04/17/2017       No results found for: DDIMER    No results found for: SITE, ALLENTEST, PHART, AQP8TSE, PO2ART, FNY5OXA, BASEEXCESS, V8JOIMLR, HGBBG, HCTABG, OXYHEMOGLOBI, METHHGBN, CARBOXYHGB, CO2CT, BAROMETRIC, MODALITY, FIO2  Lab Results   Component Value Date    HGBA1C 7.2 (H) 12/12/2016         No results found for: LIPASE    IMAGING DATA:     Xr Chest 1 View    Result Date: 4/17/2017  Narrative: PROCEDURE: XR CHEST 1 VW-  HISTORY: Pre-Op Evaluation; S91.302D-Unspecified open wound, left foot, subsequent encounter; R60.0-Localized edema  COMPARISON: February 20, 2017.  FINDINGS: A right internal jugular dialysis catheter is in place with the tip in the right atrium. The heart is normal in size. The mediastinum is unremarkable. The patient is status post median sternotomy and CABG procedure..  There are low lung volumes with bibasilar atelectasis. There is no pneumothorax. There are no acute osseous abnormalities.         Impression: No acute cardiopulmonary process.   This report was finalized on 4/17/2017 7:21 AM by Dawood Britton M.D..        DIAGNOSIS   #1 non-Q myocardial infarction: Patient has a high risk non-Q myocardial infarction with diffuse ST segment changes on  EKG.  Her risk profile is astronomically.  On discussing her options she does not want any invasive workup pursued.  I do agree with this plan given her overall health status.  Nevertheless on the same.  Patient says she wants everything done for her to keep her alive.  Has been explained to her that these 2 are not compatible with each other.  She needs to consider a comfort care on a DNR status.  Had a discussion in length about the same.  Patient is not convinced about this as of yet.    #2-will pursue medical therapy for now.  She is on dual antiplatelet therapy along with Coumadin will have to discontinue the aspirin for sure.  Will obtain a 2-D echo cardiac exam to assess for wall motion of normality.  The EKG is suggestive of possible LAD territory ischemia.    #3 LV function assessment: Will obtain a coronary gram in the morning to assess for LV systolic function and any valvular abnormalities.    #4 guideline medical therapy appears to be appropriate with the mother statin therapy at One antiplatelet drug along with anti-graduation therapy beta blocker therapy    #5 anti-fibrillation: The rate control appears to be marginal Will discontinue the antihypertensive therapy and continue to maximize rate control therapy in her at this time.  This should help with ischemia too     #6 peripheral vascular disease has had a recent intervention.  We'll continue medical therapy.    Overall prognosis of the patient appears to be very poor.  Thank you again for ready me take part in the care of Mr. name.        Active Problems:    Wound, open, foot    Dialysis patient    Diabetes    CAD (coronary artery disease)    Cancer          I discussed the patients findings and my recommendations with patient    Karel Omer MD  04/17/17  8:18 PM      Please note that portions of this note may have been completed with a voice recognition program. Efforts were made to edit the dictations, but occasionally words are  mistranscribed.

## 2017-04-18 NOTE — PLAN OF CARE
Problem: Patient Care Overview (Adult)  Goal: Plan of Care Review  Outcome: Ongoing (interventions implemented as appropriate)    04/18/17 1553   Coping/Psychosocial Response Interventions   Plan Of Care Reviewed With patient   Patient Care Overview   Progress no change         Problem: NPPV/CPAP (Adult)  Goal: Signs and Symptoms of Listed Potential Problems Will be Absent or Manageable (NPPV/CPAP)  Outcome: Ongoing (interventions implemented as appropriate)    04/18/17 1553   NPPV/CPAP   Problems Assessed (NPPV/CPAP) skin breakdown

## 2017-04-18 NOTE — PLAN OF CARE
Problem: Patient Care Overview (Adult)  Goal: Plan of Care Review  Outcome: Ongoing (interventions implemented as appropriate)    04/18/17 0429   Coping/Psychosocial Response Interventions   Plan Of Care Reviewed With patient   Patient Care Overview   Progress no change   Outcome Evaluation   Outcome Summary/Follow up Plan dialysis done today, tolerated well, continue to monitor.       Goal: Discharge Needs Assessment  Outcome: Ongoing (interventions implemented as appropriate)    Problem: Perioperative Period (Adult)  Goal: Signs and Symptoms of Listed Potential Problems Will be Absent or Manageable (Perioperative Period)  Outcome: Ongoing (interventions implemented as appropriate)    Problem: Fall Risk (Adult)  Goal: Identify Related Risk Factors and Signs and Symptoms  Outcome: Outcome(s) achieved Date Met:  04/18/17  Goal: Absence of Falls  Outcome: Ongoing (interventions implemented as appropriate)

## 2017-04-18 NOTE — PROGRESS NOTES
Continued Stay Note  ELIZABETH Smith     Patient Name: Linda Snyder  MRN: [id number]  Today's Date: 4/18/2017    Admit Date: 4/17/2017          Discharge Plan       04/18/17 1120    Case Management/Social Work Plan    Plan Spoke with Alize Monteiro at [facility] who reports pt is current pt there LTC Medicaid pending.  Can return at DC.   Attempted to visit pt however pt off floor at time of visit.  Will attempt later in shift.              Discharge Codes     None        Expected Discharge Date and Time     Expected Discharge Date Expected Discharge Time    Apr 20, 2017             Elena Stoddard

## 2017-04-18 NOTE — PLAN OF CARE
Problem: Patient Care Overview (Adult)  Goal: Plan of Care Review  Outcome: Ongoing (interventions implemented as appropriate)    04/18/17 1547   Coping/Psychosocial Response Interventions   Plan Of Care Reviewed With patient   Patient Care Overview   Progress improving       Goal: Adult Individualization and Mutuality  Outcome: Ongoing (interventions implemented as appropriate)  Goal: Discharge Needs Assessment  Outcome: Ongoing (interventions implemented as appropriate)    Problem: Perioperative Period (Adult)  Goal: Signs and Symptoms of Listed Potential Problems Will be Absent or Manageable (Perioperative Period)  Outcome: Ongoing (interventions implemented as appropriate)    Problem: Fall Risk (Adult)  Goal: Absence of Falls  Outcome: Ongoing (interventions implemented as appropriate)    Problem: NPPV/CPAP (Adult)  Goal: Signs and Symptoms of Listed Potential Problems Will be Absent or Manageable (NPPV/CPAP)  Outcome: Ongoing (interventions implemented as appropriate)    Problem: Pressure Ulcer (Adult)  Goal: Signs and Symptoms of Listed Potential Problems Will be Absent or Manageable (Pressure Ulcer)  Outcome: Ongoing (interventions implemented as appropriate)

## 2017-04-18 NOTE — PROGRESS NOTES
INPATIENT PROGRESS NOTE    Date of Admission: 4/17/2017  Length of Stay: 0  Primary Care Physician: Pradeep Monteiro MD    Subjective   HPI:   Patient scheduled for surgical intervention today with Dr. Schulz -- planned for left leg and heel wound debridement. In pre-op, she was noted to be tachycardic in the one-teens. Per anesthesia, she reported dyspnea and chest pain yesterday, but not today. She tells me she has only had dyspnea. EKG showed inferior and lateral st depression. Surgery held, Hospitalist consulted for admission for further evaluation and treatment.     Patient was found to have a troponin greater than 5. Cardiology has been consulted.     Interval History:  04/18/17 - Patient seen and examined.  She tells me she is feeling better today, she has been dialyzed.  She slept with CPAP overnight.  She underwent cardiac cath today --> reports plans to go to [place] for further intervention.  Denies fevers/chills/N/V/D, chest pain or dyspnea.    Objective      Temp:  [98 °F (36.7 °C)-98.6 °F (37 °C)] 98 °F (36.7 °C)  Heart Rate:  [] 105  Resp:  [16-18] 16  BP: ()/(49-61) 113/49    Gen: Alert, appropriate, pleasant and interactive  HEENT: EOMI, ATNC, MMM  Neck: Supple  Heart: S1S2, RRR  Lungs: CTA bilaterally, no wheezes, rales, or rhonchi  Abdomen: Soft, NTND, BS+  Extremities: Warm, well-perfused, + pulses  Skin: Abdomen with right drain site with remarkable improvement, cellulitic area much improved, small area left of midline open with a little purulent drainage, cultured today.  Neuro: A/O x3, speech clear    Results Review:    I have reviewed the labs, radiology results and diagnostic studies.      Results from last 7 days  Lab Units 04/18/17  1330 04/18/17  0607 04/17/17  0646   SODIUM mmol/L 137 141 134*   POTASSIUM mmol/L 4.1 3.8 5.1   CHLORIDE mmol/L 105 106 98   TOTAL CO2 mmol/L 24.0* 27.0 24.0*   BUN mg/dL 22* 20 47*   CREATININE mg/dL 2.90* 2.60* 4.60*   CALCIUM mg/dL 7.3* 7.6*  7.9*   BILIRUBIN mg/dL  --  0.4 0.5   ALK PHOS U/L  --  112 124   ALT (SGPT) U/L  --  53 41   AST (SGOT) U/L  --  57* 42   GLUCOSE mg/dL 177* 111* 543*         Results from last 7 days  Lab Units 04/18/17  0607 04/17/17  0647   WBC 10*3/mm3 11.09* 12.66*   HEMOGLOBIN g/dL 9.5* 9.5*   HEMATOCRIT % 33.0* 32.1*   PLATELETS 10*3/mm3 196 171   MONOCYTES % % 6.0  --          Results from last 7 days  Lab Units 04/18/17  0607 04/17/17  0650   INR  3.22* 2.97*     I have reviewed the medications.      Current Facility-Administered Medications:   •  acetaminophen (TYLENOL) tablet 650 mg, 650 mg, Oral, Q4H PRN, Karel Omer MD  •  amiodarone (PACERONE) tablet 100 mg, 100 mg, Oral, Daily, Bernardino Rivera MD, 100 mg at 04/18/17 0900  •  atorvastatin (LIPITOR) tablet 40 mg, 40 mg, Oral, Daily, Bernardino Rivera MD, 40 mg at 04/18/17 0900  •  b complex-vitamin c-folic acid (NEPHRO-ANTOINE) tablet 1 tablet, 1 tablet, Oral, Daily, Bernardino Rivera MD, 1 tablet at 04/18/17 0900  •  clonazePAM (KlonoPIN) tablet 0.5 mg, 0.5 mg, Oral, TID PRN, Bernardino Rivera MD, 0.5 mg at 04/17/17 2239  •  [MAR Hold] heparin (porcine) injection 4,000 Units, 4,000 Units, Intracatheter, Once, Cruz Chicas MD  •  HYDROcodone-acetaminophen (NORCO) 5-325 MG per tablet 1 tablet, 1 tablet, Oral, Q4H PRN, Karel Omer MD, 1 tablet at 04/18/17 1812  •  [MAR Hold] insulin aspart (novoLOG) injection 15 Units, 15 Units, Subcutaneous, TID With Meals, Bernardino Rivera MD, 15 Units at 04/17/17 1802  •  insulin aspart prot-insulin aspart (novoLOG 70/30) injection 4 Units, 4 Units, Subcutaneous, BID With Meals, Bernardino Rivera MD, 4 Units at 04/18/17 1804  •  [MAR Hold] insulin detemir (LEVEMIR) injection 20 Units, 20 Units, Subcutaneous, Nightly, Bernardino Rivera MD, 20 Units at 04/17/17 2239  •  metoprolol succinate XL (TOPROL-XL) 24 hr tablet 25 mg, 25 mg, Oral, Q12H, Karel Omer MD, 25 mg  at 04/18/17 0900  •  ondansetron (ZOFRAN) injection 4 mg, 4 mg, Intravenous, Q6H PRN, Karel Omer MD  •  ondansetron (ZOFRAN) tablet 4 mg, 4 mg, Oral, Q8H PRN, Bernardino Rivera MD  •  saccharomyces boulardii (FLORASTOR) capsule 250 mg, 250 mg, Oral, Q12H, Bernardino Rivera MD, 250 mg at 04/18/17 0900  •  sodium chloride 0.9 % bolus 1,000 mL, 1,000 mL, Intravenous, PRN, Cruz Chicas MD  •  sodium chloride 0.9 % flush 1-10 mL, 1-10 mL, Intravenous, PRN, Jonathan Schulz DPM  •  [MAR Hold] sodium chloride 0.9 % flush 1-10 mL, 1-10 mL, Intravenous, PRN, Bernardino Rivera MD  •  sodium chloride 0.9 % infusion, 100 mL/hr, Intravenous, Continuous, Karel Omer MD, Last Rate: 100 mL/hr at 04/18/17 1056, 100 mL/hr at 04/18/17 1056  •  ticagrelor (BRILINTA) tablet 90 mg, 90 mg, Oral, BID, Karel Omer MD, 90 mg at 04/18/17 1813  •  [START ON 4/19/2017] warfarin (COUMADIN) tablet 6 mg, 6 mg, Oral, Daily, Karel Omer MD    Assessment/Plan     Problem List  Active Problems:    Wound, open, foot    Type 2 diabetes mellitus    NSTEMI (non-ST elevated myocardial infarction)    CAD (coronary artery disease)    ESRD (end stage renal disease) on dialysis    Atrial fibrillation    Chronic anticoagulation    Adenocarcinoma of colon, s/p recent resection    HTN (hypertension)    Anemia      Assessment/Plan  ESRD on HD  - Dr. Chicas following  - [facility] HD     NSTEMI  CAD with hx of MI/stent  - Brilinta/metoprolol/statin  - Dr. Omer consulted  - [facility] today with the following findings:  Hemodynamic Findings:  Ao pressure: 126/80  mmHg  LVEDP: 26  mmHg  DIAGNOSTIC CONCLUSIONS:   #1 recent occlusion of his saphenous venous graft to the PDA.  #2 critical disease involving the proximal RCA of severity over 95% in the mid RCA of 70% with blood flow into the posterior lateral segment.  #3 occluded LAD and a midsection with a patent LIMA to distal LAD demonstrating competitive flow [LIMA  not engaged].  #4 patent SVG to OM with no runoff vessels.  #5 Patent circumflex coronary artery with 50% narrowing proximally and 50% in the obtuse marginal 1.  #6 low normal LV systolic function with moderate hypokinesis of the inferior wall of the left ventricle.  #7 Moderate elevation in LVedp with moderate to severe mitral insufficiency.   #8 patent diagonal 1-diffusely diseased with luminal narrowing up to 70% proximally.     RECOMMENDATION:  Given the above findings Patient Is a High Risk Candidate for Any Intervention.  The Acute Event Appears to Be Related to the Closure of the Saphenous Venous Graft to the PDA.  At This Point It Becomes Critical That We At Least Open the Native RCA to Fill the Posterior Lateral Segment.  Patient Will Be Referred for the Same.  The Other Option Is to Wait until the INR Gets Better and Bring Her Back for the Same.  Remainder of the Diffuse Disease Will Be Treated Medically.      Atrial Fibrillation  - amiodarone, metoprolol, warfarin  - Dr. Omer following     DMII  - accuchecks, basal/bolus/correctional insulin     Left foot and heel wound  - Dr. Schulz to take to OR when medically ready     Colon cancer s/p recent resection     Continue home meds as appropriate     Bandemia on today's CBC --> will add abx in form of doxy and augmentin    Consult Nutrition  DVT Prophylaxis: warfarin     AM labs      Bernardino Rivera MD 04/18/17 7:49 PM

## 2017-04-18 NOTE — PROGRESS NOTES
"Adult Nutrition  Assessment/PES    Patient Name:  Linda Snyder  YOB: 1945  MRN: [id number]  Admit Date:  4/17/2017    Assessment Date:  4/18/2017        Reason for Assessment       04/18/17 1047    Reason for Assessment    Reason For Assessment/Visit admission assessment    Identified At Risk By Screening Criteria weight status;diagnosis;BMI    Diagnosis Diagnosis    Cardiac HTN;Other (comment);CAD;DHF   Afib, NSTEMI    Endocrine DM Type 2    Hematological Anemia    Renal ESRD;Hemodialysis    Skin Skin breakdown;Pressure ulcer   open wound to foot, pressure ulcer to coccyx noted per NSG                Anthropometrics       04/18/17 1050    Anthropometrics    Height Method Stated    Height 157.5 cm (62\")    Weight Method Bed scale    Weight 82.3 kg (181 lb 7 oz)    Ideal Body Weight (IBW)    Ideal Body Weight (IBW), Female 50.83    % Ideal Body Weight 162.24    Body Mass Index (BMI)    BMI (kg/m2) 33.25    BMI Grade 30 - 34.9- obesity - grade I            Labs/Tests/Procedures/Meds       04/18/17 1050    Labs/Tests/Procedures/Meds    Labs/Tests Review Reviewed   High: Cr, AST   Low: Calcium, Total Protein, Albumin, Hgb, Hct, HDL Cholesterol    Medication Review Reviewed, pertinent;Insulin;Multivitamin;Other (comment);Anticoag            Physical Findings       04/18/17 1051    Physical Findings/Assessment    Additional Documentation Physical Appearance (Group)    Physical Appearance    Overall Physical Appearance obese    Skin pressure ulcer(s)            Estimated/Assessed Needs       04/18/17 1052    Calculation Measurements    Weight Used For Calculations 58.7 kg (129 lb 6.6 oz)   Adjusted    Height Used for Calculations 1.575 m (5' 2\")    Estimated/Assessed Energy Needs    Energy Need Method Pomerene-St Hungor    Age 71    RMR (Pomerene-St. Jeor Equation) 1065.25    Activity Factors (P[place] St Hungor)  Out of bed, ambulatory  1.3    Estimated Kcal Range  ~9438-5115 Kcal    Estimated/Assessed " Protein Needs    Weight Used for Protein Calculation 82.3 kg (181 lb 7 oz)   Actual    Protein (gm/kg) 0.8    0.8 Gm Protein (gm) 65.84    Estimated Protein Range ~49.38-65.84 gm    Estimated/Assessed Fluid Needs    Fluid Need Method RDA method    RDA Method (mL)  --   output + 1000 ml            Nutrition Prescription Ordered       04/18/17 1054    Nutrition Prescription PO    Current PO Diet Regular    Common Modifiers Cardiac            Evaluation of Received Nutrient/Fluid Intake       04/18/17 1055    PO Evaluation    Number of Days PO Intake Evaluated 2 days    Number of Meals 1    % PO Intake 100              Problem/Interventions:        Problem 1       04/18/17 1055    Nutrition Diagnoses Problem 1    Problem 1 Overweight/Obesity    Etiology (related to) Other (comment)   over consumption of calories with limited moderate physical activity PTA    Signs/Symptoms (evidenced by) BMI    BMI 30 - 34.9            Problem 2       04/18/17 1056    Nutrition Diagnoses Problem 2    Problem 2 Increased Nutrient Needs    Macronutrient Kcal;Fluid;Protein    Micronutrient Vitamin;Mineral    Etiology (related to) Medical Diagnosis    Skin Pressure ulcer;Skin breakdown    Signs/Symptoms (evidenced by) Other (comment)   increased demand for nutrients r/t pulmonary dysfunction            Problem 3       04/18/17 1057    Nutrition Diagnoses Problem 3    Problem 3 Altered Nutrition Related to Labs    Etiology (related to) Medical Diagnosis    Hematological Anemia    Renal ESRD;Hemodialysis    Signs/Symptoms (evidenced by) Biochemical    Labs Reviewed Done    Specific Labs Noted --   Elevated: Cr   Low: Hgb, Hct                Intervention Goal       04/18/17 1057    Intervention Goal    General Meet nutritional needs for age/condition    PO PO intake (%)    PO Intake % 75 %   continue with 75% or greater p.o. intake            Nutrition Intervention       04/18/17 1058    Nutrition Intervention    RD/Tech Action Encourage  intake;Supplement provided;Follow Tx progress;Interview for preference;Advise available snack            Nutrition Prescription       04/18/17 1058    Nutrition Prescription PO    PO Prescription Begin/change diet;Begin/change supplement    Supplement Nepro    Supplement Frequency 2 times a day    Common Modifiers Renal    New PO Prescription Ordered? No, recommended    Other Orders    Obtain Weight Daily    Obtain Weight Ordered? No, recommended    Supplement Vitamin mineral supplement    Supplement Ordered? No, recommended            Education/Evaluation       04/18/17 1054    Education    Education Provided education regarding;Education topics    Education Topics Cardiac diabetic;Renal diet;CHF;Weight management - maintain    Monitor/Evaluation    Monitor Per protocol;PO intake;Supplement intake;Pertinent labs;Weight        Comments:  Rec. #1: Consider adding renal to current diet order. Continue to encourage p.o. Intake. Pt. Consuming ~100% of meals on average per NSG. RD added Nepro BID. Rec. #2: Continue with NephroVite. RD to follow pt. Consult RD PRN.    Electronically signed by:  Beverly Willoughby RD  04/18/17 10:59 AM

## 2017-04-18 NOTE — PLAN OF CARE
Problem: Patient Care Overview (Adult)  Goal: Plan of Care Review  Outcome: Ongoing (interventions implemented as appropriate)    04/18/17 1512   Coping/Psychosocial Response Interventions   Plan Of Care Reviewed With patient   Patient Care Overview   Progress improving   Outcome Evaluation   Outcome Summary/Follow up Plan (pt stated feels like overall improving related to wounds )         Problem: Pressure Ulcer (Adult)  Goal: Signs and Symptoms of Listed Potential Problems Will be Absent or Manageable (Pressure Ulcer)  Outcome: Ongoing (interventions implemented as appropriate)    04/18/17 1512   Pressure Ulcer   Problems Assessed (Pressure Ulcer) all   Problems Present (Pressure Ulcer) wound complications;wound progression/extension;infection

## 2017-04-18 NOTE — PLAN OF CARE
Called pt and discussed hybrid ablation - reviewed process and possible need to repeat catheter ablation post hybrid  Discuss procedure, benefits and risks  He is agreeable to proceed and wants it done as soon as possible  Discussed with Dr Sra Ranjan agee to proceed  MJ notified   Problem: NPPV/CPAP (Adult)  Intervention: Prevent Aspiration During Therapy    04/18/17 0246   Positioning   Head Of Bed (HOB) Position HOB elevated       Intervention: Assess/Manage Patient Tolerance of Noninvasive Ventilation    04/18/17 0246   Respiratory Interventions   Airway/Ventilation Management airway patency maintained;pulmonary hygiene promoted       Intervention: Prevent/Minimize Device Friction/Shearing and Pressure Points    04/18/17 0246   Respiratory Interventions   NPPV/CPAP Maintenance other (see comments)  (mask fit appropriate)         Goal: Signs and Symptoms of Listed Potential Problems Will be Absent or Manageable (NPPV/CPAP)  Outcome: Ongoing (interventions implemented as appropriate)    04/18/17 0246   NPPV/CPAP   Problems Assessed (NPPV/CPAP) hypoxia/hypoxemia;situational response;skin breakdown;dry mucous membranes   Problems Present (NPPV/CPAP) none

## 2017-04-19 NOTE — PLAN OF CARE
Problem: Patient Care Overview (Adult)  Goal: Plan of Care Review  Outcome: Ongoing (interventions implemented as appropriate)    04/19/17 0559   Coping/Psychosocial Response Interventions   Plan Of Care Reviewed With patient   Patient Care Overview   Progress no change   Outcome Evaluation   Outcome Summary/Follow up Plan rested well overnight, no acute event's continue to monitor.       Goal: Discharge Needs Assessment  Outcome: Ongoing (interventions implemented as appropriate)    Problem: Fall Risk (Adult)  Goal: Absence of Falls  Outcome: Ongoing (interventions implemented as appropriate)    Problem: Pressure Ulcer (Adult)  Goal: Signs and Symptoms of Listed Potential Problems Will be Absent or Manageable (Pressure Ulcer)  Outcome: Outcome(s) achieved Date Met:  04/19/17

## 2017-04-19 NOTE — PLAN OF CARE
Problem: NPPV/CPAP (Adult)  Intervention: Monitor/Manage Anxiety Related to NPPV/CPAP    04/19/17 0111   Coping Strategies   Trust Relationship/Rapport care explained;empathic listening provided;choices provided       Intervention: Prevent Aspiration During Therapy    04/19/17 0111   Positioning   Head Of Bed (HOB) Position HOB elevated         Goal: Signs and Symptoms of Listed Potential Problems Will be Absent or Manageable (NPPV/CPAP)  Outcome: Ongoing (interventions implemented as appropriate)    04/19/17 0111   NPPV/CPAP   Problems Assessed (NPPV/CPAP) hypoxia/hypoxemia;dry mucous membranes;skin breakdown;situational response   Problems Present (NPPV/CPAP) none

## 2017-04-19 NOTE — PROGRESS NOTES
"   LOS: 1 day   Patient Care Team:  Pradeep Monteiro MD as PCP - General      Interval History: Patient is doing the same.  No acute symptoms at this time.  He is unaware that she had a cardiac catheterization yesterday.  Once every option explored with respect to the heart.  Continues to have pain with respect to her legs.        Review of Systems:   Pertinent items are noted in HPI.      Objective     Vital Sign Min/Max for last 24 hours  Temp  Min: 98.1 °F (36.7 °C)  Max: 98.1 °F (36.7 °C)   BP  Min: 94/47  Max: 114/63   Pulse  Min: 80  Max: 97   Resp  Min: 18  Max: 20   SpO2  Min: 94 %  Max: 100 %   Flow (L/min)  Min: 2  Max: 2   Weight  Min: 183 lb 6.4 oz (83.2 kg)  Max: 183 lb 6.4 oz (83.2 kg)     Flowsheet Rows         First Filed Value    Admission Height  62\" (157.5 cm) Documented at 04/17/2017 1642    Admission Weight  180 lb 3 oz (81.7 kg) Documented at 04/17/2017 1642          Physical Exam:     General Appearance:    Alert, cooperative, in no acute distress   Head:    Normocephalic, without obvious abnormality, atraumatic   Eyes:            Lids and lashes normal, conjunctivae and sclerae normal, no   icterus, no pallor, corneas clear, PERRLA   Ears:    Ears appear intact with no abnormalities noted   Throat:   No oral lesions, no thrush, oral mucosa moist   Neck:   No adenopathy, supple, trachea midline, no thyromegaly, no     carotid bruit, no JVD   Back:     No kyphosis present, no scoliosis present, no skin lesions,       erythema or scars, no tenderness to percussion or                   palpation,   range of motion normal   Lungs:     Clear to auscultation,respirations regular, even and                   unlabored    Heart:    Regular rhythm and normal rate, normal S1 and S2, no            murmur, no gallop, no rub, no click   Breast Exam:    Deferred   Abdomen:     Normal bowel sounds, no masses, no organomegaly, soft        non-tender, non-distended, no guarding, no rebound                 " Tenderness bandage on the unhealed wound in the lower part of the abdomen.     Genitalia:    Deferred   Extremities: Pulses noted noted in the heels as well as the toes.     Pulses: Very weak pulses and nonpalpable most both lower ex 20s.     Skin:   No bleeding, bruising or rash   Lymph nodes:   No palpable adenopathy   Neurologic:   Cranial nerves 2 - 12 grossly intact, sensation intact, DTR        present and equal bilaterally        Results Review:     I reviewed the patient's new clinical results.    Results Review:   I reviewed the patient's new clinical results.    EKG sinus rhythm with flipped T waves in the inferior inferolateral leads suggestive of ischemia.    LAB DATA :       Results from last 7 days  Lab Units 04/17/17 2029   CHOLESTEROL mg/dL 80   TRIGLYCERIDES mg/dL 108   HDL CHOL mg/dL 24*       Laboratory results:      Results from last 7 days  Lab Units 04/19/17  0744 04/18/17  1330 04/18/17  0607 04/17/17  0646   SODIUM mmol/L 139 137 141 134*   POTASSIUM mmol/L 4.3 4.1 3.8 5.1   CHLORIDE mmol/L 108* 105 106 98   TOTAL CO2 mmol/L 20.0* 24.0* 27.0 24.0*   BUN mg/dL 29* 22* 20 47*   CREATININE mg/dL 3.60* 2.90* 2.60* 4.60*   CALCIUM mg/dL 7.2* 7.3* 7.6* 7.9*   BILIRUBIN mg/dL 0.4  --  0.4 0.5   ALK PHOS U/L 130*  --  112 124   ALT (SGPT) U/L 54  --  53 41   AST (SGOT) U/L 69*  --  57* 42   GLUCOSE mg/dL 115* 177* 111* 543*       Results from last 7 days  Lab Units 04/19/17  0744 04/18/17  0607 04/17/17  0647   WBC 10*3/mm3 14.67* 11.09* 12.66*   HEMOGLOBIN g/dL 10.0* 9.5* 9.5*   HEMATOCRIT % 34.6* 33.0* 32.1*   PLATELETS 10*3/mm3 231 196 171       Results from last 7 days  Lab Units 04/19/17  0744 04/18/17  0607 04/17/17  0650   INR  4.02* 3.22* 2.97*               Results from last 7 days  Lab Units 04/19/17  0744   TROPONIN I ng/mL 7.310*                 Lab Results   Component Value Date    HGBA1C 7.2 (H) 12/12/2016       Results from last 7 days  Lab Units 04/19/17  0744   TSH mIU/mL 2.880            IMAGING DATA:     Xr Chest 1 View    Result Date: 4/17/2017  Narrative: PROCEDURE: XR CHEST 1 VW-  HISTORY: Pre-Op Evaluation; S91.302D-Unspecified open wound, left foot, subsequent encounter; R60.0-Localized edema  COMPARISON: February 20, 2017.  FINDINGS: A right internal jugular dialysis catheter is in place with the tip in the right atrium. The heart is normal in size. The mediastinum is unremarkable. The patient is status post median sternotomy and CABG procedure..  There are low lung volumes with bibasilar atelectasis. There is no pneumothorax. There are no acute osseous abnormalities.         Impression: No acute cardiopulmonary process.   This report was finalized on 4/17/2017 7:21 AM by Dawood Britton M.D..            Assessment/Plan   #1 non-Q myocardial infarction: This appears to be related to her acute closure of the saphenous venous graft to the PDA.  This appears to be completed infarct with a troponin on its way down now.  This was discussed with her primary cardiologist Dr. Alex he wants to continue medical therapy and avoid any intervention at this time.    #2 CAD: Patient does have significant CAD involving the native RCA of 99% leading into a posterior lateral branch.  Would definitely benefit benefit with intervention to this segment nevertheless her functional status is very questionable at this time.  Dr. Alex wants to reevaluate this in outpatient basis and then decide if he wants to proceed with intervention.  At this time would continue medical therapy which would include antiplatelet therapy and anticoagulation therapy along with high-dose statin therapy and beta blocker therapy.    #3 peripheral vascular disease: Patient apparently has had recent intervention for the same.  To be followed up with the same physician.    #4 Anti coagulation therapy: Has been on Coumadin her INR is up to 4.  His Coumadin is on hold needs to be adjusted for felt appropriate international  normalized ratio.    #5 diabetes ulcer with ulcer in the heels with?  Ostial myelitis: The patient is being worked up for the same and getting antibiotics for this.  This will be as per ID as well as the hospitalist team.    #6 atrial fibrillation: Rate seems to be well controlled at this time.    Overall prognosis of the patient is extremely poor.  This is been expanded to her repeatedly.   to follow-up with her primary cardiologist on 10 May.  Further decisions for intervention based on meeting.    Principal Problem:    NSTEMI (non-ST elevated myocardial infarction)  Active Problems:    Wound, open, foot    Type 2 diabetes mellitus    CAD (coronary artery disease)    ESRD (end stage renal disease) on dialysis    Atrial fibrillation    Chronic anticoagulation    Adenocarcinoma of colon, s/p recent resection    HTN (hypertension)    Anemia            Karel Omer MD  04/19/17  6:49 PM

## 2017-04-19 NOTE — PROGRESS NOTES
"Nephrology Progress Note.    LOS: 1 day    Patient Care Team:  Pradeep Monteiro MD as PCP - General    Chief Complaint:  No chief complaint on file.      Subjective   Patient seen and examined this morning.  Events from last night noted.  Patient denies having any fevers chills.  No nausea or vomiting no abdominal pain.  Denies any chest pain shortness of breath cough or sputum production.  There is no significant edema.   Patient also denies having new onset weakness of numbness of either extremity.  She slept well used her CPAP all night long.  This morning when I asked her about her cardiac catheterization from yesterday she had no recollection that she ever had it done.  Details were discussed with Dr. Omer and he has made arrangements for her to be transferred to  for further intervention.  Details were also discussed with the wound care nurse where it has been noted that she has multiple wound on her leg as well as drainage from the surgical site in the belly as well as some ulcers on her vagina.      Review of Systems:    The pertinent  ROS was done and it is noted above, rest  was negative.    Objective     Vital Signs  /67  Pulse 90  Temp 98 °F (36.7 °C) (Oral)   Resp 18  Ht 62\" (157.5 cm)  Wt 183 lb 6.4 oz (83.2 kg)  LMP Comment: HYSTERECTOMY  SpO2 100%  BMI 33.54 kg/m2           Intake/Output Summary (Last 24 hours) at 04/19/17 0741  Last data filed at 04/19/17 0600   Gross per 24 hour   Intake          3386.67 ml   Output                0 ml   Net          3386.67 ml       Physical Exam:    General Appearance: alert, oriented x 3, no acute distress,   HEENT: pupils round and reactive to light, oral mucosa dry, extra occular movements intact.  Neck: supple, no JVD, trachea midline  Lungs: Clear to Auscultation, unlabored breathing effort  Heart: IRRR, normal S1 and S2, no S3, no rub.  She does have 2/6 systolic ejection murmur  Abdomen: soft, non-tender, no palpable bladder, present " bowel sounds to auscultation  Extremities: Trace to 1+ edema, cyanosis or clubbing.   Neuro: normal speech and mental status, grossly non focal.     Results Review:      Results from last 7 days  Lab Units 04/18/17  1330 04/18/17  0607 04/17/17  0646   SODIUM mmol/L 137 141 134*   POTASSIUM mmol/L 4.1 3.8 5.1   CHLORIDE mmol/L 105 106 98   TOTAL CO2 mmol/L 24.0* 27.0 24.0*   BUN mg/dL 22* 20 47*   CREATININE mg/dL 2.90* 2.60* 4.60*   CALCIUM mg/dL 7.3* 7.6* 7.9*   BILIRUBIN mg/dL  --  0.4 0.5   ALK PHOS U/L  --  112 124   ALT (SGPT) U/L  --  53 41   AST (SGOT) U/L  --  57* 42   GLUCOSE mg/dL 177* 111* 543*       Estimated Creatinine Clearance: 18.3 mL/min (by C-G formula based on Cr of 2.9).      Results from last 7 days  Lab Units 04/18/17  1330 04/18/17  0607   MAGNESIUM mg/dL  --  2.1   PHOSPHORUS mg/dL 5.3*  --                Results from last 7 days  Lab Units 04/18/17  0607 04/17/17  0647   WBC 10*3/mm3 11.09* 12.66*   HEMOGLOBIN g/dL 9.5* 9.5*   PLATELETS 10*3/mm3 196 171         Results from last 7 days  Lab Units 04/18/17  0607 04/17/17  0650   INR  3.22* 2.97*         Imaging Results (last 24 hours)     ** No results found for the last 24 hours. **          amiodarone 100 mg Oral Daily   amoxicillin-clavulanate 1 tablet Oral Q12H   atorvastatin 40 mg Oral Daily   b complex-vitamin c-folic acid 1 tablet Oral Daily   doxycycline 100 mg Oral Q12H   [MAR Hold] heparin (porcine) 4,000 Units Intracatheter Once   insulin aspart 15 Units Subcutaneous TID With Meals   insulin aspart prot-insulin aspart 4 Units Subcutaneous BID With Meals   insulin detemir 20 Units Subcutaneous Nightly   metoprolol succinate XL 25 mg Oral Q12H   saccharomyces boulardii 250 mg Oral Q12H   ticagrelor 90 mg Oral BID       sodium chloride 100 mL/hr Last Rate: 100 mL/hr (04/18/17 7125)       Medication Review:   Current Facility-Administered Medications   Medication Dose Route Frequency Provider Last Rate Last Dose   • acetaminophen  (TYLENOL) tablet 650 mg  650 mg Oral Q4H PRN Karel Omer MD       • amiodarone (PACERONE) tablet 100 mg  100 mg Oral Daily Bernardino Rivera MD   100 mg at 04/18/17 0900   • amoxicillin-clavulanate (AUGMENTIN) 500-125 MG per tablet 500 mg  1 tablet Oral Q12H Bernardino Rivera MD   500 mg at 04/18/17 2345   • atorvastatin (LIPITOR) tablet 40 mg  40 mg Oral Daily Bernardino Rivera MD   40 mg at 04/18/17 0900   • b complex-vitamin c-folic acid (NEPHRO-ANTOINE) tablet 1 tablet  1 tablet Oral Daily Bernardino Rivera MD   1 tablet at 04/18/17 0900   • clonazePAM (KlonoPIN) tablet 0.5 mg  0.5 mg Oral TID PRN Bernardino Rivera MD   0.5 mg at 04/18/17 2345   • doxycycline (MONODOX) capsule 100 mg  100 mg Oral Q12H Bernardino Rivera MD   100 mg at 04/18/17 2345   • [MAR Hold] heparin (porcine) injection 4,000 Units  4,000 Units Intracatheter Once Cruz Chicas MD       • HYDROcodone-acetaminophen (NORCO) 5-325 MG per tablet 1 tablet  1 tablet Oral Q4H PRN Karel Omer MD   1 tablet at 04/18/17 1812   • insulin aspart (novoLOG) injection 15 Units  15 Units Subcutaneous TID With Meals Bernardino Rivera MD       • insulin aspart prot-insulin aspart (novoLOG 70/30) injection 4 Units  4 Units Subcutaneous BID With Meals Bernardino Rivera MD   4 Units at 04/18/17 1804   • insulin detemir (LEVEMIR) injection 20 Units  20 Units Subcutaneous Nightly Bernardino Rivera MD       • metoprolol succinate XL (TOPROL-XL) 24 hr tablet 25 mg  25 mg Oral Q12H Karel Omer MD   25 mg at 04/18/17 2146   • ondansetron (ZOFRAN) injection 4 mg  4 mg Intravenous Q6H PRN Karel Omer MD       • ondansetron (ZOFRAN) tablet 4 mg  4 mg Oral Q8H PRN Bernardino Rivera MD       • saccharomyces boulardii (FLORASTOR) capsule 250 mg  250 mg Oral Q12H Bernardino Rivera MD   250 mg at 04/18/17 2146   • sodium chloride 0.9 % bolus 1,000 mL  1,000 mL Intravenous PRN  Cruz Chicas MD       • sodium chloride 0.9 % flush 1-10 mL  1-10 mL Intravenous PRN Jonathan Schulz DPM       • [MAR Hold] sodium chloride 0.9 % flush 1-10 mL  1-10 mL Intravenous PRN Bernardino Rivera MD       • sodium chloride 0.9 % infusion  100 mL/hr Intravenous Continuous Karel Omer  mL/hr at 04/18/17 2150 100 mL/hr at 04/18/17 2150   • ticagrelor (BRILINTA) tablet 90 mg  90 mg Oral BID Karel Omer MD   90 mg at 04/18/17 1813       Assessment/Plan        1. ESRD (Dialysis patient): Her dialysis schedule is Monday Wednesday and Friday, will make arrangements.  Her next dialysis is dueToday.  2. Anemia: H&H has been fairly stable current hemoglobin of 9.5 continue to watch closely if needed we'll transfuse.  3. Wound, open, foot: Surgical intervention planned that did not go through yesterday, Dr. Schulz will reevaluate once she is slightly more stable.  4. Diabetes: Since in the hospital and being treated aggressively it has been under pretty good control.  Continue the sliding scale.  5. CAD (coronary artery disease): Positive troponin noted cardiac evaluation is in progress.  Details were discussed with Dr. Omer and she has fairly advanced cardiac issues she was noted to have one of her grafts clotted off and the circumflex that has significant disease likely will need intervention.  She is being transferred to [facility] for all this.  Already have a bed for her.  6. NSTEMI (non-ST elevated myocardial infarction): Heart cath is done yesterday.  7.  Adenocarcinoma of colon, s/p recent resection: Status post surgery I'm not sure at this point if she has followed up with the oncology or not.  The site that is draining has been cultured will need further evaluation and may need further intervention as well.  8. Hypertension: blood pressures under fairly good control with current medication and will be continued.  9. Obstructive sleep apnea: stable use of CPAP in the past,  continue home settings.  She did use it all night long.  10.  Atrial fibrillation: History of currently appears to be in and out of A. fib.  She is on a monitored bed.  11. Chronic anticoagulation: Will need to be watched closely as her INR has increased to greater than 3 today.         Details were discussed with the patient as well as family in the room.  I will also discussed the assessment and plan with the hospitalist service.    Rest as ordered.    Cruz Chicas MD  04/19/17  7:41 AM     Please note that portions of this note may have been completed with a voice recognition program. Efforts were made to edit the dictations, but occasionally words are mistranscribed.

## 2017-04-19 NOTE — DISCHARGE SUMMARY
Date of Discharge:  4/19/2017    Discharge Diagnosis: NSTEMI    Presenting Problem/  Edema of extremity of unknown cause [R60.0]  Wound, open, foot, left, subsequent encounter [S91.302D]  Wound, open, foot, left, subsequent encounter [S91.302D]  NSTEMI (non-ST elevated myocardial infarction) [I21.4]   History of Present Illness    Patient scheduled for surgical intervention today with Dr. Schulz -- planned for left leg and heel wound debridement. In pre-op, she was noted to be tachycardic in the one-teens. Per anesthesia, she reported dyspnea and chest pain yesterday, but not today.  EKG showed diffuse ST changes and inferior and lateral ST depression. Surgery held, Hospital Medicine was consulted for for evaluation and treatment.    She has been clear repeatedly that she would like everything done to help her heart and to stay alive.      Patient was found to have a troponin greater than 5. Cardiology was consulted. The patient was dialyzed and the following day she was taken for [facility] [please see report below]   Intervention was felt to be beneficial but high risk at this facility.   Dr. Omer contacted Dr. Tenorio at  who agreed to accept her for transfer.      Hospital Course  She has been lucid, interactive and stable since admission. She has had occasional episodes of chest pain and SOA which were short lived and responded to conservative measures.    I was notified that she has a bed.    Procedures Performed  Procedure(s):  Left Heart Cath  Coronary angiography  Left ventriculography       Consults:   Consults     Date and Time Order Name Status Description    4/17/2017 1417 Inpatient Consult to Nephrology Completed     4/17/2017 0729 Inpatient Consult to Hospitalist      4/17/2017 0729 Inpatient Consult to Cardiology            Pertinent Test Results:       Results from last 7 days  Lab Units 04/19/17  0744 04/18/17  0607 04/17/17  0647   WBC 10*3/mm3 14.67* 11.09* 12.66*   HEMOGLOBIN g/dL 10.0* 9.5* 9.5*    HEMATOCRIT % 34.6* 33.0* 32.1*   PLATELETS 10*3/mm3 231 196 171         Results from last 7 days  Lab Units 04/19/17  0744 04/18/17  1330 04/18/17  0607 04/17/17  0646   SODIUM mmol/L 139 137 141 134*   POTASSIUM mmol/L 4.3 4.1 3.8 5.1   CHLORIDE mmol/L 108* 105 106 98   TOTAL CO2 mmol/L 20.0* 24.0* 27.0 24.0*   BUN mg/dL 29* 22* 20 47*   CREATININE mg/dL 3.60* 2.90* 2.60* 4.60*   CALCIUM mg/dL 7.2* 7.3* 7.6* 7.9*   BILIRUBIN mg/dL 0.4  --  0.4 0.5   ALK PHOS U/L 130*  --  112 124   ALT (SGPT) U/L 54  --  53 41   AST (SGOT) U/L 69*  --  57* 42   GLUCOSE mg/dL 115* 177* 111* 543*       Results from last 7 days  Lab Units 04/19/17  0744 04/18/17  0607 04/17/17 2029   TROPONIN I ng/mL 7.310* 8.260* 7.550*     Lab Results   Component Value Date    TSH 2.880 04/19/2017     Lab Results   Component Value Date    HGBA1C 7.2 (H) 12/12/2016     Lab Results   Component Value Date    CHOL 80 04/17/2017     Lab Results   Component Value Date    TRIG 108 04/17/2017     Lab Results   Component Value Date    HDL 24 (L) 04/17/2017     Lab Results   Component Value Date    LDLCALC 34 04/17/2017     Imaging Results (most recent)     Procedure Component Value Units Date/Time    XR Chest 1 View [[id number]] Collected:  04/17/17 0721     Updated:  04/17/17 0723    Narrative:       PROCEDURE: XR CHEST 1 VW-     HISTORY: Pre-Op Evaluation; S91.302D-Unspecified open wound, left foot,  subsequent encounter; R60.0-Localized edema     COMPARISON: February 20, 2017.     FINDINGS: A right internal jugular dialysis catheter is in place with  the tip in the right atrium. The heart is normal in size. The  mediastinum is unremarkable. The patient is status post median  sternotomy and CABG procedure..  There are low lung volumes with  bibasilar atelectasis. There is no pneumothorax. There are no acute  osseous abnormalities.           Impression:       No acute cardiopulmonary process.        This report was finalized on 4/17/2017 7:21 AM by Dawood  DINESH Britton.                  04/18/17  Procedures Performed:  Coronary Angiography, left ventriculography, bypass graft angiography, attempted LIMA angiography     Hemodynamic Findings:  Ao pressure: 126/80  mmHg  LVEDP: 26  mmHg        DIAGNOSTIC CONCLUSIONS:   #1 recent occlusion of his saphenous venous graft to the PDA.     #2 critical disease involving the proximal RCA of severity over 95% in the mid RCA of 70% with blood flow into the posterior lateral segment.     #3 occluded LAD and a midsection with a patent LIMA to distal LAD demonstrating competitive flow [LIMA not engaged].     #4 patent SVG to OM with no runoff vessels.     #5 Patent circumflex coronary artery with 50% narrowing proximally and 50% in the obtuse marginal 1.     #6 low normal LV systolic function with moderate hypokinesis of the inferior wall of the left ventricle.     #7 Moderate elevation in LVedp with moderate to severe mitral insufficiency.     #8 patent diagonal 1-diffusely diseased with luminal narrowing up to 70% proximally.         RECOMMENDATION:   Given the above findings Patient Is a High Risk Candidate for Any Intervention.  The Acute Event Appears to Be Related to the Closure of the Saphenous Venous Graft to the PDA.  At This Point It Becomes Critical That We At Least Open the Native RCA to Fill the Posterior Lateral Segment.  Patient Will Be Referred for the Same.  The Other Option Is to Wait until the INR Gets Better and Bring Her Back for the Same.  Remainder of the Diffuse Disease Will Be Treated Medically.                Condition on Discharge:  GOOD and STABLE    Vital Signs  Temp:  [98.1 °F (36.7 °C)] 98.1 °F (36.7 °C)  Heart Rate:  [] 97  Resp:  [16-20] 20  BP: ()/(47-67) 109/58    Physical Exam:  Gen: Alert, appropriate, pleasant and interactive  HEENT: NC/AT, PERRL, MMM, OP FREE OF ACUTE LESIONS  Neck: Supple ML TRACHEA  Heart: S1S2, RRR 2/6 SYSTOLIC MURMUR DIFFICULT TO LOCALIZE  Chest ---Tunneled  HD catheter (R) chest   Lungs: CTA bilaterally X right post lat coarseness, no wheezes, or rhonchi  Abdomen: Soft, NTND, BS+ postsurgical with well healing abd wound except for area of drainage without evidence of surrounding infection lower 1/4 of incision  2 scabbed drain sites    Extremities: Warm, well-perfused, + pulses; left foot s/p amputation of toes. Cutaneous wounds appear to be healing and are free of nancy infection  Skin: P/W/D  Neuro: A/O x3, speech clear      Discharge Disposition  Short Term Hospital (DC - External) -- [facility]    Discharge Medications   Linda Snyder   Home Medication Instructions LINO:[id number]    Printed on:04/19/17 5468   Medication Information                      amiodarone (PACERONE) 200 MG tablet  Take 100 mg by mouth Daily.             amoxicillin-clavulanate (AUGMENTIN) 500-125 MG per tablet  Take 1 tablet by mouth Every 12 (Twelve) Hours. Indications: Skin and Soft Tissue Infection             aspirin 81 MG EC tablet  Take 81 mg by mouth Daily.             atorvastatin (LIPITOR) 10 MG tablet  Take 40 mg by mouth Daily.             B Complex-C-Folic Acid (AMBIKA-ANTOINE) tablet  Take 1 tablet by mouth Daily.             clonazePAM (KlonoPIN) 0.5 MG tablet  Take 0.5 mg by mouth 3 (Three) Times a Day As Needed for Seizures.             clopidogrel (PLAVIX) 75 MG tablet  Take 75 mg by mouth Daily.             doxycycline (MONODOX) 100 MG capsule  Take 1 capsule by mouth Every 12 (Twelve) Hours. Indications: Skin and Soft Tissue Infection             HYDROcodone-acetaminophen (NORCO) 5-325 MG per tablet  Take 1 tablet by mouth Every 8 (Eight) Hours for 9 days.             insulin detemir (LEVEMIR) 100 UNIT/ML injection  Inject 20 Units under the skin Every Night.             lisinopril (PRINIVIL,ZESTRIL) 5 MG tablet  Take 5 mg by mouth Daily.             metoprolol succinate XL (TOPROL-XL) 25 MG 24 hr tablet  Take 12.5 mg by mouth Daily.             MULTIPLE VITAMIN  PO  Take 1 tablet by mouth Daily.             NOVOLOG MIX 70/30 (70-30) 100 UNIT/ML injection  Inject 4 Units under the skin 2 (Two) Times a Day With Meals. [facility] PATIENT USES SLIDING SCALE TWICE DAILY             ondansetron (ZOFRAN) 4 MG tablet  Take 4 mg by mouth Every 8 (Eight) Hours As Needed for nausea or vomiting.             pitavastatin calcium (LIVALO) 1 MG tablet tablet  Take  by mouth Every Night.             Pollen Extracts (PROSTAT PO)  Take 30 mL by mouth 3 (Three) Times a Day Before Meals.             saccharomyces boulardii (FLORASTOR) 250 MG capsule  Take 250 mg by mouth 2 (Two) Times a Day.             TRUE METRIX BLOOD GLUCOSE TEST test strip  1 each by Other route As Needed.             vitamin C (ASCORBIC ACID) 500 MG tablet  Take 500 mg by mouth Daily.             warfarin (COUMADIN) 6 MG tablet  Take 6 mg by mouth Daily.             Zinc Sulfate 220 (50 ZN) MG tablet  Take 220 mg by mouth Daily.                 Discharge Diet:     Activity at Discharge:     Follow-up Appointments  Future Appointments  Date Time Provider Department Center   4/26/2017 1:50 PM Jonathan Schuzl DPM [facility] None         Test Results Pending at Discharge   Order Current Status    MRSA Screen Culture In process    VRE Culture In process    Wound Culture In process           Bernardino Rivera MD  04/19/17  4:45 PM    Time Spent: 75m was spent on the discharge of this patient

## 2017-04-19 NOTE — PROGRESS NOTES
"    Patient Care Team:  Pradeep Monteiro MD as PCP - General    Chief complaint: multiple left lower extremity wounds    Subjective .   She seen at bedside today resting well and sleeping.  She states she felt sick during watch but never actually did vomit.  She currently today denies any other constitutional symptoms.  Her mental status is still waxing and waning      Review of Systems  All systems were reviewed and negative except for chief complaint.    History  Past Medical History:   Diagnosis Date   • Adenocarcinoma of colon, s/p recent resection 4/17/2017   • Afib    • Anemia    • Anemia 4/17/2017   • Arthritis    • Atrial fibrillation 4/17/2017   • CAD (coronary artery disease)    • Cancer     HX COLON MASS WITH RESECTION   • CHF (congestive heart failure)     DIASTOLIC   • Chronic anticoagulation 4/17/2017    Warfarin for a fib   • Decreased mobility     [facility] REPOR[facility] MOTHER IS BEDFAST AND REQUIRES ASSISTANCE WITH ALL TRANSFERS   • Diabetes     USES INSULIN, HX OF ALSO BEING ON PO MEDS   • Dialysis patient     [facility] DIALYSIS ON MONDAY WEDNESDAY FRIDAY   • ESRD (end stage renal disease) on dialysis 4/17/2017   • Full dentures    • High cholesterol    • HTN (hypertension) 4/17/2017   • Hypertension    • Oxygen dependent    • PONV (postoperative nausea and vomiting)     [facility] REPORTS MINIMAL   • Renal disease     END STAGE   • Sleep apnea     PATIENT WEARS A BIPAP HS   • Uses nebulizer and inhaler at home    , Past Surgical History:   Procedure Laterality Date   • CARDIAC SURGERY      [facility] REPOR[facility] HX OF CARDIAC CATH WITH STENT PLACEMENT   • COLON SURGERY      [facility] STAFF REPORTED PATIENT HAD A BOWEL RESECTION APPROXIMATELY 3 WEEKS AGO FOR NECROTIC BOWEL.   • COLONOSCOPY     • CORONARY STENT PLACEMENT      [facility] REPORTS \"SEVERAL YEARS AGO, NOT SURE HOW MANY STENTS SHE HAS\"   • ENDOSCOPY     • FOOT IRRIGATION, DEBRIDEMENT AND REPAIR Left 2/22/2017    Procedure: Left leg and heel wound " debridements with grafting and possible wound VAC application.;  Surgeon: Jonathan Schulz DPM;  Location: [facility] OR;  Service:    • FOOT SURGERY Left     3rd, 4th, 5th MT amputation   • HYSTERECTOMY     • OTHER SURGICAL HISTORY      [facility] REPOR[facility] DIALYSIS PORT IN UPPER RIGHT CHEST WALL.   • WISDOM TOOTH EXTRACTION     , Family History   Problem Relation Age of Onset   • Diabetes Mother    • Diabetes Sister    • Diabetes Brother    • Hypertension Other    • Hyperlipidemia Other    • Heart disease Other    , Social History   Substance Use Topics   • Smoking status: Former Smoker     Types: Cigarettes   • Smokeless tobacco: Never Used      Comment: S[facility] REPORTS MINIMAL AND THAT PATIENT SMOKED 30-40- YEARS AGO   • Alcohol use No   , Prescriptions Prior to Admission   Medication Sig Dispense Refill Last Dose   • amiodarone (PACERONE) 200 MG tablet Take 100 mg by mouth Daily.   4/16/2017 at 0900   • aspirin 81 MG EC tablet Take 81 mg by mouth Daily.   4/16/2017 at 0900   • atorvastatin (LIPITOR) 10 MG tablet Take 40 mg by mouth Daily.   4/16/2017 at 0900   • B Complex-C-Folic Acid (AMBIKA-ANTOINE) tablet Take 1 tablet by mouth Daily.   4/16/2017 at 0900   • clonazePAM (KlonoPIN) 0.5 MG tablet Take 0.5 mg by mouth 3 (Three) Times a Day As Needed for Seizures.   4/16/2017 at 2000   • clopidogrel (PLAVIX) 75 MG tablet Take 75 mg by mouth Daily.   4/16/2017 at 0900   • insulin detemir (LEVEMIR) 100 UNIT/ML injection Inject 20 Units under the skin Every Night.   4/16/2017 at 0600   • lisinopril (PRINIVIL,ZESTRIL) 5 MG tablet Take 5 mg by mouth Daily.   4/16/2017 at 2000   • metoprolol succinate XL (TOPROL-XL) 25 MG 24 hr tablet Take 12.5 mg by mouth Daily.   4/16/2017 at 0900   • MULTIPLE VITAMIN PO Take 1 tablet by mouth Daily.   4/3/2017   • saccharomyces boulardii (FLORASTOR) 250 MG capsule Take 250 mg by mouth 2 (Two) Times a Day.   4/16/2017 at 0900   • vancomycin 50 MG/ML solution Take 125 mg by mouth 2  (Two) Times a Day.   4/16/2017 at 1700   • vitamin C (ASCORBIC ACID) 500 MG tablet Take 500 mg by mouth Daily.   4/16/2017 at 0900   • warfarin (COUMADIN) 6 MG tablet Take 6 mg by mouth Daily.   4/13/2017   • Zinc Sulfate 220 (50 ZN) MG tablet Take 220 mg by mouth Daily.   4/16/2017 at 0900   • NOVOLOG MIX 70/30 (70-30) 100 UNIT/ML injection Inject 4 Units under the skin 2 (Two) Times a Day With Meals. [facility] REPORTS PATIENT USES SLIDING SCALE TWICE DAILY   Unknown at Unknown time   • ondansetron (ZOFRAN) 4 MG tablet Take 4 mg by mouth Every 8 (Eight) Hours As Needed for nausea or vomiting.   Unknown at Unknown time   • pitavastatin calcium (LIVALO) 1 MG tablet tablet Take  by mouth Every Night.   4/16/2017 at Unknown time   • Pollen Extracts (PROSTAT PO) Take 30 mL by mouth 3 (Three) Times a Day Before Meals.   Unknown at Unknown time   • TRUE METRIX BLOOD GLUCOSE TEST test strip 1 each by Other route As Needed.  0 Unknown at Unknown time   , Scheduled Meds:    amiodarone 100 mg Oral Daily   amoxicillin-clavulanate 1 tablet Oral Q12H   atorvastatin 40 mg Oral Daily   b complex-vitamin c-folic acid 1 tablet Oral Daily   doxycycline 100 mg Oral Q12H   [MAR Hold] heparin (porcine) 4,000 Units Intracatheter Once   insulin aspart 15 Units Subcutaneous TID With Meals   insulin aspart prot-insulin aspart 4 Units Subcutaneous BID With Meals   insulin detemir 20 Units Subcutaneous Nightly   metoprolol succinate XL 25 mg Oral Q12H   saccharomyces boulardii 250 mg Oral Q12H   ticagrelor 90 mg Oral BID   , Continuous Infusions:    sodium chloride 100 mL/hr Last Rate: 100 mL/hr (04/19/17 0910)   , PRN Meds:  •  acetaminophen  •  clonazePAM  •  HYDROcodone-acetaminophen  •  ondansetron  •  ondansetron  •  sodium chloride  •  [MAR Hold] sodium chloride and Allergies:  Floraquin [iodoquinol] and Niacin and related    Objective     Vital Signs   /58 (BP Location: Left arm, Patient Position: Lying)  Pulse 97  " Temp 98.1 °F (36.7 °C) (Oral)   Resp 20  Ht 62\" (157.5 cm)  Wt 183 lb 6.4 oz (83.2 kg)  LMP Comment: HYSTERECTOMY  SpO2 94%  BMI 33.54 kg/m2    Physical Exam:  She has air boots on both heels now.  She has a dressing around the left lower extremity that's been changed since I applied her last in a boot.       Assessment/Plan   71-year-old diabetic female with multiple other critical medical comorbidities and multiple left lower extremity wounds with calcaneal osteomyelitis  Principal Problem:    NSTEMI (non-ST elevated myocardial infarction)  Active Problems:    Wound, open, foot    Type 2 diabetes mellitus    CAD (coronary artery disease)    ESRD (end stage renal disease) on dialysis    Atrial fibrillation    Chronic anticoagulation    Adenocarcinoma of colon, s/p recent resection    HTN (hypertension)    Anemia    I just spoke with Dr. Omer as well as Dr. Rivera.  The patient is going to be transferred to  for further workup and evaluation of her cardiac issues.  For now we'll hold off on any intervention on her lower extremity.  This may be managed medically at  or potentially surgically but that is to be determined.  I recommend we keep her left lower extremity dressed and clean with Betadine wet-to-dry dressings in hopes to prevent any further spread of infection.  He should also continue with the foam boots and keep pressure off her heels.  Ultimately this will be a very slippery slope with the left lower extremity and a limb salvage.  Ideally from a cardiac standpoint would like to keep the leg but this may become an issue if it progresses and she develops any sepsis or bacteremia due to the calcaneal osteotome.  When she is out of  if she can manage to follow back up with me we can then consider further dentures and steps at limb salvage but it may become a point where amputation is necessary.  She doesn't have any family or anyone present or involved to discuss this with.  No one from the " nursing home is with her today either.  The patient herself doesn't have a great grasp of the reality due to dementia and her altered mental status sorts difficult to try to discuss and explain all this to her and her actually remember it.  He can follow-up with me know when she's discharge from the hospital and wound care.        Jonathan Schulz DPM  04/19/17  4:37 PM

## 2017-06-27 NOTE — ED NOTES
DR SIEGEL WAS CALLED AT [phone]. ANOTHER CALL WAS MADE AT [phone], CALL WAS TRANSFERRED TO ARABELLA Allison  06/27/17 2281

## 2017-07-26 NOTE — ED NOTES
Called and spoke with Anai at [facility]. Pt will be sent back. Dr. Cristobal will follow-up with patient tomorrow.      Sonya Ugalde RN  07/26/17 2452

## 2017-07-26 NOTE — DISCHARGE INSTRUCTIONS
Follow-up with Dr. Cristobal/ evaluation.  Return to emergency department if any change or worsening.

## 2017-07-26 NOTE — ED PROVIDER NOTES
Subjective   HPI Comments: 71-year-old patient from local nursing home presents for constipation for 3 days, no relief from stool softeners or enemas at nursing home.  She denies abdominal pain fevers chills sweats no vomiting.  No nausea.  Complains of full ischemia but no pain.  She also has a history of atrial fibrillation, takes Coumadin daily.  She denies chest pain or shortness of breath.     Patient is a 71 y.o. female presenting with constipation.   Constipation   Severity:  Moderate  Timing:  Constant  Progression:  Unchanged  Chronicity:  Recurrent  Context: not dehydration, not dietary changes, not medication and not narcotics    Stool description:  None produced  Relieved by:  Nothing  Worsened by:  Nothing  Ineffective treatments:  Enemas and stool softeners  Associated symptoms: no abdominal pain, no anorexia, no back pain, no diarrhea, no dysuria, no fever, no flatus, no hematochezia, no nausea, no urinary retention and no vomiting        Review of Systems   Constitutional: Negative for chills, diaphoresis, fatigue and fever.   HENT: Negative for congestion and sore throat.    Respiratory: Negative for cough, choking, chest tightness, shortness of breath and wheezing.    Cardiovascular: Negative for chest pain and leg swelling.   Gastrointestinal: Positive for constipation. Negative for abdominal distention, abdominal pain, anal bleeding, anorexia, blood in stool, diarrhea, flatus, hematochezia, nausea and vomiting.   Genitourinary: Negative for difficulty urinating, dysuria, flank pain, frequency, hematuria and urgency.   Musculoskeletal: Negative for back pain.   All other systems reviewed and are negative.      Past Medical History:   Diagnosis Date   • Adenocarcinoma of colon, s/p recent resection 4/17/2017   • Afib    • Anemia    • Anemia 4/17/2017   • Arthritis    • Atrial fibrillation 4/17/2017   • CAD (coronary artery disease)    • Cancer     HX COLON MASS WITH RESECTION   • CHF (congestive  "heart failure)     DIASTOLIC   • Chronic anticoagulation 4/17/2017    Warfarin for a fib   • Decreased mobility     [facility] REPOR[facility] MOTHER IS BEDFAST AND REQUIRES ASSISTANCE WITH ALL TRANSFERS   • Diabetes     USES INSULIN, HX OF ALSO BEING ON PO MEDS   • Dialysis patient     [facility] DIALYSIS ON MONDAY WEDNESDAY FRIDAY   • ESRD (end stage renal disease) on dialysis 4/17/2017   • Full dentures    • High cholesterol    • HTN (hypertension) 4/17/2017   • Hypertension    • Oxygen dependent    • PONV (postoperative nausea and vomiting)     [facility] REPORTS MINIMAL   • Renal disease     END STAGE   • Sleep apnea     PATIENT WEARS A BIPAP HS   • Uses nebulizer and inhaler at home        Allergies   Allergen Reactions   • Floraquin [Iodoquinol] Itching   • Niacin And Related Itching   • Ciprofloxacin Hcl Other (See Comments)       Past Surgical History:   Procedure Laterality Date   • CARDIAC CATHETERIZATION N/A 4/18/2017    Procedure: Left Heart Cath;  Surgeon: Karel Omer MD;  Location: [facility] CATH INVASIVE LOCATION;  Service:    • CARDIAC CATHETERIZATION N/A 4/18/2017    Procedure: Coronary angiography;  Surgeon: Karel Omer MD;  Location: [facility] CATH INVASIVE LOCATION;  Service:    • CARDIAC CATHETERIZATION N/A 4/18/2017    Procedure: Left ventriculography;  Surgeon: Karel Omer MD;  Location: [facility] CATH INVASIVE LOCATION;  Service:    • CARDIAC SURGERY      [facility] REPORTS HX OF CARDIAC CATH WITH STENT PLACEMENT   • COLON SURGERY      [facility] STAFF REPORTED PATIENT HAD A BOWEL RESECTION APPROXIMATELY 3 WEEKS AGO FOR NECROTIC BOWEL.   • COLONOSCOPY     • CORONARY STENT PLACEMENT      [facility] REPORTS \"SEVERAL YEARS AGO, NOT SURE HOW MANY STENTS SHE HAS\"   • ENDOSCOPY     • FOOT IRRIGATION, DEBRIDEMENT AND REPAIR Left 2/22/2017    Procedure: Left leg and heel wound debridements with grafting and possible wound VAC application.;  Surgeon: Jonathan Schulz DPM;  Location: [facility] OR;  " Service:    • FOOT SURGERY Left     3rd, 4th, 5th MT amputation   • HYSTERECTOMY     • OTHER SURGICAL HISTORY      [facility] REPORTS DIALYSIS PORT IN UPPER RIGHT CHEST WALL.   • WISDOM TOOTH EXTRACTION         Family History   Problem Relation Age of Onset   • Diabetes Mother    • Diabetes Sister    • Diabetes Brother    • Hypertension Other    • Hyperlipidemia Other    • Heart disease Other        Social History     Social History   • Marital status:      Spouse name: N/A   • Number of children: N/A   • Years of education: N/A     Social History Main Topics   • Smoking status: Former Smoker     Types: Cigarettes   • Smokeless tobacco: Never Used      Comment: SON REPORTS MINIMAL AND THAT PATIENT SMOKED 30-40- YEARS AGO   • Alcohol use No   • Drug use: No   • Sexual activity: Defer     Other Topics Concern   • None     Social History Narrative           Objective   Physical Exam   Constitutional: She is oriented to person, place, and time. She appears well-developed and well-nourished. No distress.   HENT:   Head: Normocephalic and atraumatic.   Right Ear: External ear normal.   Left Ear: External ear normal.   Nose: Nose normal.   Mouth/Throat: Oropharynx is clear and moist. No oropharyngeal exudate.   Eyes: Conjunctivae and EOM are normal. Pupils are equal, round, and reactive to light. Right eye exhibits no discharge. Left eye exhibits no discharge. No scleral icterus.   Neck: Normal range of motion. Neck supple. No JVD present. No tracheal deviation present. No thyromegaly present.   Cardiovascular: Normal heart sounds and intact distal pulses.  Exam reveals no gallop and no friction rub.    No murmur heard.  irreg irreg rhythm, rate variable 90's-110's.   Pulmonary/Chest: Effort normal and breath sounds normal. No stridor. No respiratory distress. She has no wheezes. She has no rales.   Abdominal: Soft. Bowel sounds are normal. She exhibits distension. There is no tenderness. There is no  rebound and no guarding. No hernia.   Musculoskeletal: Normal range of motion. She exhibits no edema, tenderness or deformity.   L BKA noted    Bilat UE's with purple ecchymoses   Neurological: She is alert and oriented to person, place, and time. No cranial nerve deficit. She exhibits normal muscle tone. Coordination normal.   Skin: Skin is warm and dry. No rash noted. She is not diaphoretic. No erythema. No pallor.   Psychiatric: She has a normal mood and affect. Her behavior is normal. Judgment and thought content normal.   Nursing note and vitals reviewed.      Procedures        Recent Results (from the past 24 hour(s))   Urinalysis With / Culture If Indicated    Collection Time: 07/26/17  4:22 PM   Result Value Ref Range    Color, UA Yellow Yellow, Straw    Appearance, UA Slightly Cloudy (A) Clear    pH, UA <=5.0 5.0 - 8.0    Specific Gravity, UA 1.025 1.005 - 1.030    Glucose, UA Negative Negative    Ketones, UA 15 mg/dL (1+) (A) Negative    Bilirubin, UA Negative Negative    Blood, UA Moderate (2+) (A) Negative    Protein, UA 30 mg/dL (1+) (A) Negative    Leuk Esterase, UA Moderate (2+) (A) Negative    Nitrite, UA Negative Negative    Urobilinogen, UA 0.2 E.U./dL 0.2 - 1.0 E.U./dL   Urinalysis, Microscopic Only    Collection Time: 07/26/17  4:22 PM   Result Value Ref Range    RBC, UA 3-5 (A) None Seen /HPF    WBC, UA 21-30 (A) None Seen /HPF    Bacteria, UA 3+ (A) None Seen /HPF    Squamous Epithelial Cells, UA 0-2 None Seen, 0-2 /HPF    Yeast, UA Small/1+ Budding Yeast w/Hyphae None Seen /HPF    Hyaline Casts, UA None Seen None Seen /LPF    Methodology Manual Light Microscopy    CBC Auto Differential    Collection Time: 07/26/17  4:34 PM   Result Value Ref Range    WBC 8.12 4.80 - 10.80 10*3/mm3    RBC 3.15 (L) 4.20 - 5.40 10*6/mm3    Hemoglobin 8.5 (L) 12.0 - 16.0 g/dL    Hematocrit 30.0 (L) 37.0 - 47.0 %    MCV 95.2 81.0 - 99.0 fL    MCH 27.0 27.0 - 31.0 pg    MCHC 28.3 (L) 30.0 - 37.0 g/dL    RDW 19.9 (H)  11.5 - 14.5 %    RDW-SD 69.3 (H) 37.0 - 54.0 fl    MPV 11.7 6.0 - 12.0 fL    Platelets 159 130 - 400 10*3/mm3    Neutrophil % 76.6 37.0 - 80.0 %    Lymphocyte % 15.5 10.0 - 50.0 %    Monocyte % 5.9 0.0 - 12.0 %    Eosinophil % 1.5 0.0 - 7.0 %    Basophil % 0.1 0.0 - 2.5 %    Immature Grans % 0.4 0.0 - 0.6 %    Neutrophils, Absolute 6.22 2.00 - 6.90 10*3/mm3    Lymphocytes, Absolute 1.26 0.60 - 3.40 10*3/mm3    Monocytes, Absolute 0.48 0.00 - 0.90 10*3/mm3    Eosinophils, Absolute 0.12 0.00 - 0.70 10*3/mm3    Basophils, Absolute 0.01 0.00 - 0.20 10*3/mm3    Immature Grans, Absolute 0.03 0.00 - 0.06 10*3/mm3    nRBC 0.2 (H) 0.0 - 0.0 /100 WBC   Protime-INR    Collection Time: 07/26/17  4:34 PM   Result Value Ref Range    Protime 34.2 (H) 9.3 - 12.1 Seconds    INR 3.12 (H) 0.90 - 1.10   Scan Slide    Collection Time: 07/26/17  4:34 PM   Result Value Ref Range    Anisocytosis Slight/1+ None Seen    Hypochromia Slight/1+ None Seen    WBC Morphology Normal Normal    Platelet Estimate Adequate Normal   Comprehensive Metabolic Panel    Collection Time: 07/26/17  5:05 PM   Result Value Ref Range    Glucose 213 (H) 74 - 98 mg/dL    BUN 43 (H) 7 - 20 mg/dL    Creatinine 2.60 (H) 0.60 - 1.30 mg/dL    Sodium 139 137 - 145 mmol/L    Potassium 4.5 3.5 - 5.1 mmol/L    Chloride 100 98 - 107 mmol/L    CO2 26.0 26.0 - 30.0 mmol/L    Calcium 8.3 (L) 8.4 - 10.2 mg/dL    Total Protein 6.8 6.3 - 8.2 g/dL    Albumin 3.30 (L) 3.50 - 5.00 g/dL    ALT (SGPT) 30 13 - 69 U/L    AST (SGOT) 23 15 - 46 U/L    Alkaline Phosphatase 117 38 - 126 U/L    Total Bilirubin 0.4 0.2 - 1.3 mg/dL    eGFR Non African Amer 18 (L) >60 mL/min/1.73    Globulin 3.5 gm/dL    A/G Ratio 0.9 (L) 1.0 - 2.0 g/dL    BUN/Creatinine Ratio 16.5 7.1 - 23.5    Anion Gap 17.5 mmol/L     Note: In addition to lab results from this visit, the labs listed above may include labs taken at another facility or during a different encounter within the last 24 hours. Please correlate lab  times with ED admission and discharge times for further clarification of the services performed during this visit.    CT Abdomen Pelvis Without Contrast   ED Interpretation   1. Postsurgical changes from right hemicolectomy with a large amount of   stool within the remaining left hemicolon. There is no evidence of a   bowel obstruction.   2. Gallstones.   3. Small bilateral pleural effusions and bibasilar atelectasis.                     776.05 mGy.cm.   16.14 mGy       This study was performed with techniques to keep radiation doses as low   as reasonably achievable (ALARA). Individualized dose reduction   techniques using automated exposure control or adjustment of mA and/or   kV according to the patient size were employed.        This report was finalized on 7/26/2017 4:04 PM by Dawood Britton M.D..      Final Result   1. Postsurgical changes from right hemicolectomy with a large amount of   stool within the remaining left hemicolon. There is no evidence of a   bowel obstruction.   2. Gallstones.   3. Small bilateral pleural effusions and bibasilar atelectasis.                     776.05 mGy.cm.   16.14 mGy       This study was performed with techniques to keep radiation doses as low   as reasonably achievable (ALARA). Individualized dose reduction   techniques using automated exposure control or adjustment of mA and/or   kV according to the patient size were employed.        This report was finalized on 7/26/2017 4:04 PM by Dawood Britton M.D..        Vitals:    07/26/17 1803 07/26/17 1822 07/26/17 1842 07/26/17 1902   BP: 144/73 153/77 122/58 125/69   BP Location:       Patient Position:       Pulse: 117 103 105 108   Resp:       Temp:       TempSrc:       SpO2: 99% 100% 99% 92%   Weight:       Height:         Medications   cefTRIAXone (ROCEPHIN) 1 g/100 mL 0.9% NS VTB (mbp) (0 g Intravenous Stopped 7/26/17 1852)     ECG/EMG Results (last 24 hours)     ** No results found for the last 24 hours. **             ED Course  ED Course   Comment By Time   IMPRESSION:  1. Postsurgical changes from right hemicolectomy with a large amount of  stool within the remaining left hemicolon. There is no evidence of a  bowel obstruction.  2. Gallstones.  3. Small bilateral pleural effusions and bibasilar atelectasis. Lg Smith PA-C 07/26 1607   Discussed with Dr. Cristobal, who covers for Dr. Jimenez, who is patient's provider while she is at [facility].  He states he will personally follow this and coordinate endoscopy and further evaluation of heme positive stool with Dr. Jimenez.  Will cover UTI with Omnicef 300mg bid x 7 days. Lg Smith PA-C 07/26 1712                  MDM    Final diagnoses:   Urinary tract infection, site unspecified   Constipation, unspecified constipation type   Type 2 diabetes mellitus with complication, unspecified long term insulin use status   ESRD (end stage renal disease) on dialysis   Atrial fibrillation, unspecified type   Chronic anticoagulation   Blood in stool            Lg Smith PA-C  07/26/17 7072

## 2017-07-26 NOTE — ED NOTES
As contents of soap suds enema exit, pink tinged fluid is noted and there are small, bright-red clots noted in stool. Lg Smith PA-C, notified.      Sonya Ugalde RN  07/26/17 1325

## 2017-07-26 NOTE — ED NOTES
AT PTS BEDSIDE TO ADMINISTER ADDITIONAL SOAP SUDS ENEMA. PT O2 SENSOR HAS FALLEN OFF AND HR ^ FROM ACTIVITY OF ROLLING.      Sonya Ugalde RN  07/26/17 4635

## 2017-11-14 NOTE — ED PROVIDER NOTES
Subjective   Patient is a 71 y.o. female presenting with weakness.   History provided by:  Patient   used: No    Weakness - Generalized   Severity:  Mild  Onset quality:  Gradual  Timing:  Intermittent  Progression:  Unchanged  Chronicity:  New  Context: recent infection    Context: not urinary tract infection    Relieved by:  Nothing  Worsened by:  Nothing  Ineffective treatments:  None tried  Associated symptoms: no abdominal pain, no chest pain, no cough, no diarrhea, no dizziness, no dysuria, no numbness in extremities, no falls, no fever, no headaches, no lethargy, no loss of consciousness, no melena, no myalgias, no nausea, no near-syncope, no seizures, no shortness of breath, no syncope, no vision change and no vomiting        Review of Systems   Constitutional: Negative for chills and fever.   HENT: Negative for congestion, rhinorrhea, sore throat and trouble swallowing.    Eyes: Negative for discharge and visual disturbance.   Respiratory: Negative for cough, chest tightness, shortness of breath and wheezing.    Cardiovascular: Negative for chest pain, palpitations, leg swelling, syncope and near-syncope.   Gastrointestinal: Negative for abdominal pain, constipation, diarrhea, melena, nausea and vomiting.   Genitourinary: Negative for dysuria, flank pain and hematuria.   Musculoskeletal: Negative for back pain, falls, myalgias and neck pain.   Skin: Negative for color change and rash.   Neurological: Negative for dizziness, seizures, loss of consciousness, weakness, numbness and headaches.   Psychiatric/Behavioral: Negative for self-injury and suicidal ideas.       Past Medical History:   Diagnosis Date   • Adenocarcinoma of colon, s/p recent resection 4/17/2017   • Afib    • Anemia    • Anemia 4/17/2017   • Arthritis    • Atrial fibrillation 4/17/2017   • CAD (coronary artery disease)    • Cancer     HX COLON MASS WITH RESECTION   • CHF (congestive heart failure)     DIASTOLIC   •  "Chronic anticoagulation 4/17/2017    Warfarin for a fib   • Decreased mobility     [facility] REPORTS MOTHER IS BEDFAST AND REQUIRES ASSISTANCE WITH ALL TRANSFERS   • Diabetes     USES INSULIN, HX OF ALSO BEING ON PO MEDS   • Dialysis patient     [facility] DIALYSIS ON MONDAY WEDNESDAY FRIDAY   • ESRD (end stage renal disease) on dialysis 4/17/2017   • Full dentures    • High cholesterol    • HTN (hypertension) 4/17/2017   • Hypertension    • Oxygen dependent    • PONV (postoperative nausea and vomiting)     [facility] REPORTS MINIMAL   • Renal disease     END STAGE   • Sleep apnea     PATIENT WEARS A BIPAP HS   • Uses nebulizer and inhaler at home        Allergies   Allergen Reactions   • Floraquin [Iodoquinol] Itching   • Niacin And Related Itching   • Ciprofloxacin Hcl Other (See Comments)       Past Surgical History:   Procedure Laterality Date   • CARDIAC CATHETERIZATION N/A 4/18/2017    Procedure: Left Heart Cath;  Surgeon: Karel Omer MD;  Location: [facility] CATH INVASIVE LOCATION;  Service:    • CARDIAC CATHETERIZATION N/A 4/18/2017    Procedure: Coronary angiography;  Surgeon: Karel Omer MD;  Location: [facility] CATH INVASIVE LOCATION;  Service:    • CARDIAC CATHETERIZATION N/A 4/18/2017    Procedure: Left ventriculography;  Surgeon: Karel Omer MD;  Location: [facility] CATH INVASIVE LOCATION;  Service:    • CARDIAC SURGERY      [facility] REPORTS HX OF CARDIAC CATH WITH STENT PLACEMENT   • COLON SURGERY      [facility] AND REHAB STAFF REPORTED PATIENT HAD A BOWEL RESECTION APPROXIMATELY 3 WEEKS AGO FOR NECROTIC BOWEL.   • COLONOSCOPY     • CORONARY STENT PLACEMENT      [facility] REPOR[facility] \"SEVERAL YEARS AGO, NOT SURE HOW MANY STENTS SHE HAS\"   • ENDOSCOPY     • FOOT IRRIGATION, DEBRIDEMENT AND REPAIR Left 2/22/2017    Procedure: Left leg and heel wound debridements with grafting and possible wound VAC application.;  Surgeon: Jonathan Schulz DPM;  Location: [facility] OR;  Service:    • FOOT SURGERY Left  "    3rd, 4th, 5th MT amputation   • HYSTERECTOMY     • OTHER SURGICAL HISTORY      [facility] AND REHAB REPORTS DIALYSIS PORT IN UPPER RIGHT CHEST WALL.   • WISDOM TOOTH EXTRACTION         Family History   Problem Relation Age of Onset   • Diabetes Mother    • Diabetes Sister    • Diabetes Brother    • Hypertension Other    • Hyperlipidemia Other    • Heart disease Other        Social History     Social History   • Marital status:      Spouse name: N/A   • Number of children: N/A   • Years of education: N/A     Social History Main Topics   • Smoking status: Former Smoker     Types: Cigarettes   • Smokeless tobacco: Never Used      Comment: SON REPORTS MINIMAL AND THAT PATIENT SMOKED 30-40- YEARS AGO   • Alcohol use No   • Drug use: No   • Sexual activity: Defer     Other Topics Concern   • None     Social History Narrative           Objective   Physical Exam   Constitutional: She is oriented to person, place, and time. She appears well-developed and well-nourished.   HENT:   Head: Normocephalic and atraumatic.   Nose: Nose normal.   Mouth/Throat: Oropharynx is clear and moist.   Eyes: Conjunctivae and EOM are normal. Pupils are equal, round, and reactive to light.   Neck: Normal range of motion. Neck supple.   Cardiovascular: Normal rate, regular rhythm, normal heart sounds and intact distal pulses.    Pulmonary/Chest: Effort normal and breath sounds normal. No respiratory distress. She has no wheezes. She exhibits no tenderness.   Abdominal: Soft. Bowel sounds are normal. There is no tenderness. There is no rebound and no guarding.   Musculoskeletal: Normal range of motion. She exhibits no edema, tenderness or deformity.   Neurological: She is alert and oriented to person, place, and time. No cranial nerve deficit. Coordination normal.   Skin: Skin is warm and dry. No rash noted. No erythema. No pallor.   Psychiatric: She has a normal mood and affect. Her behavior is normal. Judgment and thought content  normal.   Nursing note and vitals reviewed.      Procedures         ED Course  ED Course                  MDM  Number of Diagnoses or Management Options  Acute cystitis without hematuria:   Atrial fibrillation, unspecified type:   ESRD (end stage renal disease) on dialysis:   NSTEMI (non-ST elevated myocardial infarction):   Diagnosis management comments: 71-year-old female brought to the emergency department for hypotension and confusion.  EMS brought the patient from a nursing facility.  EMS reports that the patient was awake, alert, oriented ×3 and had an normal blood pressure.  In the emergency department, patient is awake, alert, oriented to person and place.  Patient is confused with regards to time.  Patient has no complaints.  Patient states that she was sent here because her blood pressure was low.  Patient is a dialysis patient and had dialysis at  yesterday.  She was admitted at  for chest pain.  She had elevated troponins that were being monitored.  Patient was discharged home.  Vital signs are significant for hypotension.  A 500 cc bolus was ordered. Blood pressure now up to 95/56. Labs significant for potassium of 6.0, elevated WBC of 15.32, UTI, and elevated troponin of 0.129. Patient given 1G of rocephin. Contacted  for transfer back to their facility for continuity of care. Dr. Segura accepts transfer.     EKG: Ventricular rate 90, AR interval 0, , QRS duration 82, atrial fibrillation.      Final diagnoses:   Acute cystitis without hematuria   ESRD (end stage renal disease) on dialysis   Atrial fibrillation, unspecified type   NSTEMI (non-ST elevated myocardial infarction)            Cameron Ford MD  11/14/17 5485

## 2017-11-14 NOTE — ED NOTES
Dr. Segura was paged by  MDs at 1326, the call was transferred to Dr. Ford at this time.      Jason Mcclendon  11/14/17 6086

## 2017-11-17 NOTE — TELEPHONE ENCOUNTER
Critical Lab results, patient is growing Ecoli ESBL, Dr. Jimenez notified and he said that patient was admitted to the hospital.

## 2017-12-02 NOTE — ED PROVIDER NOTES
Subjective   HPI Comments: 72-year-old female presenting with chest pain and shortness of breath.  She states that after dialysis this evening she began to have complications, left-sided chest pain, this is described as an achy pain, it radiates to her left arm, there are no alleviating or aggravating factors.  It is been associated with some increased shortness of breath.  She was just admitted recently to the [facility] for heart failure and A. fib.  She denies any fevers, chills, nausea, vomiting, numbness, weakness.  She has not taken her night meds.      Review of Systems   Constitutional: Negative for chills and fever.   HENT: Negative for congestion, rhinorrhea and sore throat.    Eyes: Negative for pain.   Respiratory: Positive for shortness of breath. Negative for cough.    Cardiovascular: Positive for chest pain and palpitations.   Gastrointestinal: Negative for abdominal pain, diarrhea, nausea and vomiting.   Genitourinary: Negative for dysuria.   Musculoskeletal: Negative for arthralgias.   Skin: Negative for rash.   Neurological: Negative for weakness and numbness.   Psychiatric/Behavioral: Negative for behavioral problems.       Past Medical History:   Diagnosis Date   • Adenocarcinoma of colon, s/p recent resection 4/17/2017   • Afib    • Anemia    • Anemia 4/17/2017   • Arthritis    • Atrial fibrillation 4/17/2017   • CAD (coronary artery disease)    • Cancer     HX COLON MASS WITH RESECTION   • CHF (congestive heart failure)     DIASTOLIC   • Chronic anticoagulation 4/17/2017    Warfarin for a fib   • Decreased mobility     [facility] REPORTS MOTHER IS BEDFAST AND REQUIRES ASSISTANCE WITH ALL TRANSFERS   • Diabetes     USES INSULIN, HX OF ALSO BEING ON PO MEDS   • Dialysis patient     [facility] DIALYSIS ON MONDAY WEDNESDAY FRIDAY   • ESRD (end stage renal disease) on dialysis 4/17/2017   • Full dentures    • High cholesterol    • HTN (hypertension) 4/17/2017   • Hypertension    • Oxygen dependent    •  "PONV (postoperative nausea and vomiting)     [facility] REPORTS MINIMAL   • Renal disease     END STAGE   • Sleep apnea     PATIENT WEARS A BIPAP HS   • Uses nebulizer and inhaler at home        Allergies   Allergen Reactions   • Floraquin [Iodoquinol] Itching   • Niacin And Related Itching   • Ciprofloxacin Hcl Other (See Comments)       Past Surgical History:   Procedure Laterality Date   • CARDIAC CATHETERIZATION N/A 4/18/2017    Procedure: Left Heart Cath;  Surgeon: Karel Omer MD;  Location: [facility] CATH INVASIVE LOCATION;  Service:    • CARDIAC CATHETERIZATION N/A 4/18/2017    Procedure: Coronary angiography;  Surgeon: Karel Omer MD;  Location: [facility] CATH INVASIVE LOCATION;  Service:    • CARDIAC CATHETERIZATION N/A 4/18/2017    Procedure: Left ventriculography;  Surgeon: Karel Omer MD;  Location: [facility] CATH INVASIVE LOCATION;  Service:    • CARDIAC SURGERY      [facility] REPORTS HX OF CARDIAC CATH WITH STENT PLACEMENT   • COLON SURGERY      [facility] AND REHAB STAFF REPORTED PATIENT HAD A BOWEL RESECTION APPROXIMATELY 3 WEEKS AGO FOR NECROTIC BOWEL.   • COLONOSCOPY     • CORONARY STENT PLACEMENT      [facility] REPORTS \"SEVERAL YEARS AGO, NOT SURE HOW MANY STENTS SHE HAS\"   • ENDOSCOPY     • FOOT IRRIGATION, DEBRIDEMENT AND REPAIR Left 2/22/2017    Procedure: Left leg and heel wound debridements with grafting and possible wound VAC application.;  Surgeon: Jonathan Schulz DPM;  Location: [facility];  Service:    • FOOT SURGERY Left     3rd, 4th, 5th MT amputation   • HYSTERECTOMY     • OTHER SURGICAL HISTORY      [facility] UPPER RIGHT CHEST WALL.   • WISDOM TOOTH EXTRACTION         Family History   Problem Relation Age of Onset   • Diabetes Mother    • Diabetes Sister    • Diabetes Brother    • Hypertension Other    • Hyperlipidemia Other    • Heart disease Other        Social History     Social History   • Marital status:      Spouse name: " N/A   • Number of children: N/A   • Years of education: N/A     Social History Main Topics   • Smoking status: Former Smoker     Types: Cigarettes   • Smokeless tobacco: Never Used      Comment: SON REPORTS MINIMAL AND THAT PATIENT SMOKED 30-40- YEARS AGO   • Alcohol use No   • Drug use: No   • Sexual activity: Defer     Other Topics Concern   • None     Social History Narrative           Objective   Physical Exam   Constitutional: She is oriented to person, place, and time. No distress.   Chronically ill-appearing   HENT:   Head: Normocephalic and atraumatic.   Right Ear: External ear normal.   Left Ear: External ear normal.   Nose: Nose normal.   Mouth/Throat: Oropharynx is clear and moist.   Eyes: Conjunctivae and EOM are normal. Pupils are equal, round, and reactive to light.   Neck: Normal range of motion. Neck supple.   Cardiovascular: Normal heart sounds and intact distal pulses.    Irregularly irregular, rate 110s to 130s, left upper extremity AV fistula with good bruit/thrill, right chest dialysis catheter   Pulmonary/Chest: Effort normal and breath sounds normal. No respiratory distress. She has no wheezes. She has no rales.   Abdominal: Soft. Bowel sounds are normal. She exhibits no distension. There is no tenderness. There is no rebound and no guarding.   Musculoskeletal: Normal range of motion. She exhibits no tenderness.   Left AKA, right extremity with trace pitting edema   Neurological: She is alert and oriented to person, place, and time.   Skin: Skin is warm and dry. No rash noted.   Psychiatric: She has a normal mood and affect. Her behavior is normal.   Nursing note and vitals reviewed.      Procedures         ED Course  ED Course                  MDM  Number of Diagnoses or Management Options  Atrial fibrillation with rapid ventricular response:   Other hypervolemia:   Diagnosis management comments: 72-year-old female with palpitations, chest pain and shortness of breath.  Chronically  ill-appearing elderly female in no distress with exam as above.  We'll check labs, EKG and chest x-ray.  We'll give dose of IV metoprolol here.  Disposition pending workup.    DDX: ACS, CHF, A. fib, electrolyte abnormality    EKG: A. fib, rate 110s, no acute ST/T changes    Workup here notable for elevated BNP.  Chest x-ray is consistent with pulmonary edema and bilateral pleural effusions.  Heart rate is slightly better controlled after the metoprolol.  Given she was just admitted at  and that is where her heart doctors are, discussed with them for readmission, they have accepted in transfer.       Amount and/or Complexity of Data Reviewed  Decide to obtain previous medical records or to obtain history from someone other than the patient: yes        Final diagnoses:   Atrial fibrillation with rapid ventricular response   Other hypervolemia            David Strange MD  12/02/17 0041

## 2017-12-02 NOTE — ED NOTES
Dr. Santizo, on-call Cardiology at [facility] paged via [facility] for Dr. Alexandr Andrews  12/02/17 0032

## 2018-01-01 ENCOUNTER — HOSPITAL ENCOUNTER (OUTPATIENT)
Dept: CARDIOLOGY | Facility: HOSPITAL | Age: 73
Setting detail: HOSPITAL OUTPATIENT SURGERY
Discharge: HOME | End: 2018-01-09
Attending: INTERNAL MEDICINE

## 2018-01-01 ENCOUNTER — HOSPITAL ENCOUNTER (EMERGENCY)
Facility: HOSPITAL | Age: 73
Discharge: ANOTHER HOSPITAL | End: 2018-01-11
Attending: EMERGENCY MEDICINE | Admitting: EMERGENCY MEDICINE

## 2018-01-01 ENCOUNTER — HOSPITAL ENCOUNTER (OUTPATIENT)
Dept: CARDIOLOGY | Facility: HOSPITAL | Age: 73
End: 2018-01-01
Attending: INTERNAL MEDICINE

## 2018-01-01 ENCOUNTER — APPOINTMENT (OUTPATIENT)
Dept: CT IMAGING | Facility: HOSPITAL | Age: 73
End: 2018-01-01

## 2018-01-01 ENCOUNTER — APPOINTMENT (OUTPATIENT)
Dept: GENERAL RADIOLOGY | Facility: HOSPITAL | Age: 73
End: 2018-01-01

## 2018-01-01 ENCOUNTER — OUTSIDE FACILITY SERVICE (OUTPATIENT)
Dept: FAMILY MEDICINE CLINIC | Facility: CLINIC | Age: 73
End: 2018-01-01

## 2018-01-01 ENCOUNTER — EPISODE CHANGES (OUTPATIENT)
Dept: CASE MANAGEMENT | Facility: OTHER | Age: 73
End: 2018-01-01

## 2018-01-01 ENCOUNTER — TRANSCRIBE ORDERS (OUTPATIENT)
Dept: CARDIOLOGY | Facility: HOSPITAL | Age: 73
End: 2018-01-01

## 2018-01-01 VITALS
TEMPERATURE: 98 F | DIASTOLIC BLOOD PRESSURE: 57 MMHG | OXYGEN SATURATION: 100 % | WEIGHT: 142 LBS | HEIGHT: 64 IN | RESPIRATION RATE: 20 BRPM | HEART RATE: 89 BPM | BODY MASS INDEX: 24.24 KG/M2 | SYSTOLIC BLOOD PRESSURE: 104 MMHG

## 2018-01-01 DIAGNOSIS — Z99.2 DIALYSIS PATIENT (HCC): Primary | ICD-10-CM

## 2018-01-01 DIAGNOSIS — N39.0 URINARY TRACT INFECTION WITHOUT HEMATURIA, SITE UNSPECIFIED: ICD-10-CM

## 2018-01-01 DIAGNOSIS — E87.5 HYPERKALEMIA: ICD-10-CM

## 2018-01-01 DIAGNOSIS — J11.1 INFLUENZA: ICD-10-CM

## 2018-01-01 DIAGNOSIS — A41.9 SEPSIS, DUE TO UNSPECIFIED ORGANISM: Primary | ICD-10-CM

## 2018-01-01 DIAGNOSIS — J81.0 ACUTE PULMONARY EDEMA (HCC): ICD-10-CM

## 2018-01-01 LAB
ABO + RH BLD: NORMAL
ABO GROUP BLD: NORMAL
ALBUMIN SERPL-MCNC: 3.7 G/DL (ref 3.5–5)
ALBUMIN/GLOB SERPL: 1.1 G/DL (ref 1–2)
ALP SERPL-CCNC: 202 U/L (ref 38–126)
ALT SERPL W P-5'-P-CCNC: 243 U/L (ref 13–69)
ANION GAP SERPL CALCULATED.3IONS-SCNC: 24 MMOL/L
ANISOCYTOSIS BLD QL: NORMAL
AST SERPL-CCNC: 545 U/L (ref 15–46)
BACTERIA SPEC AEROBE CULT: ABNORMAL
BACTERIA UR QL AUTO: ABNORMAL /HPF
BASOPHILS # BLD AUTO: 0.03 10*3/MM3 (ref 0–0.2)
BASOPHILS NFR BLD AUTO: 0.2 % (ref 0–2.5)
BH BB BLOOD EXPIRATION DATE: NORMAL
BH BB BLOOD TYPE BARCODE: 6200
BH BB DISPENSE STATUS: NORMAL
BH BB PRODUCT CODE: NORMAL
BH BB UNIT NUMBER: NORMAL
BILIRUB SERPL-MCNC: 0.5 MG/DL (ref 0.2–1.3)
BILIRUB UR QL STRIP: ABNORMAL
BLD GP AB SCN SERPL QL: NEGATIVE
BUN BLD-MCNC: 81 MG/DL (ref 7–20)
BUN/CREAT SERPL: 10.9 (ref 7.1–23.5)
CALCIUM SPEC-SCNC: 9 MG/DL (ref 8.4–10.2)
CHLORIDE SERPL-SCNC: 100 MMOL/L (ref 98–107)
CLARITY UR: ABNORMAL
CO2 SERPL-SCNC: 21 MMOL/L (ref 26–30)
COLOR UR: YELLOW
CREAT BLD-MCNC: 7.4 MG/DL (ref 0.6–1.3)
DEPRECATED RDW RBC AUTO: 59.9 FL (ref 37–54)
EOSINOPHIL # BLD AUTO: 0 10*3/MM3 (ref 0–0.7)
EOSINOPHIL NFR BLD AUTO: 0 % (ref 0–7)
ERYTHROCYTE [DISTWIDTH] IN BLOOD BY AUTOMATED COUNT: 16.7 % (ref 11.5–14.5)
FLUAV AG NPH QL: POSITIVE
FLUBV AG NPH QL IA: NEGATIVE
GFR SERPL CREATININE-BSD FRML MDRD: 5 ML/MIN/1.73
GLOBULIN UR ELPH-MCNC: 3.4 GM/DL
GLUCOSE BLD-MCNC: 113 MG/DL (ref 74–98)
GLUCOSE BLDC GLUCOMTR-MCNC: 116 MG/DL (ref 70–130)
GLUCOSE BLDC GLUCOMTR-MCNC: 130 MG/DL (ref 70–130)
GLUCOSE BLDC GLUCOMTR-MCNC: 162 MG/DL (ref 70–130)
GLUCOSE BLDC GLUCOMTR-MCNC: 92 MG/DL (ref 70–130)
GLUCOSE UR STRIP-MCNC: NEGATIVE MG/DL
GRAM STN SPEC: ABNORMAL
GRAM STN SPEC: ABNORMAL
HCT VFR BLD AUTO: 42.9 % (ref 37–47)
HGB BLD-MCNC: 12.6 G/DL (ref 12–16)
HGB UR QL STRIP.AUTO: ABNORMAL
HOLD SPECIMEN: NORMAL
HYALINE CASTS UR QL AUTO: ABNORMAL /LPF
HYPOCHROMIA BLD QL: NORMAL
IMM GRANULOCYTES # BLD: 0.11 10*3/MM3 (ref 0–0.06)
IMM GRANULOCYTES NFR BLD: 0.7 % (ref 0–0.6)
INR PPP: 6.14 (ref 0.9–1.1)
ISOLATED FROM: ABNORMAL
KETONES UR QL STRIP: ABNORMAL
LEUKOCYTE ESTERASE UR QL STRIP.AUTO: ABNORMAL
LYMPHOCYTES # BLD AUTO: 1.17 10*3/MM3 (ref 0.6–3.4)
LYMPHOCYTES NFR BLD AUTO: 7.7 % (ref 10–50)
MCH RBC QN AUTO: 29.3 PG (ref 27–31)
MCHC RBC AUTO-ENTMCNC: 29.4 G/DL (ref 30–37)
MCV RBC AUTO: 99.8 FL (ref 81–99)
MONOCYTES # BLD AUTO: 0.78 10*3/MM3 (ref 0–0.9)
MONOCYTES NFR BLD AUTO: 5.2 % (ref 0–12)
NEUTROPHILS # BLD AUTO: 13.03 10*3/MM3 (ref 2–6.9)
NEUTROPHILS NFR BLD AUTO: 86.2 % (ref 37–80)
NITRITE UR QL STRIP: NEGATIVE
NRBC BLD MANUAL-RTO: 1.4 /100 WBC (ref 0–0)
PH UR STRIP.AUTO: <=5 [PH] (ref 5–8)
PLATELET # BLD AUTO: 120 10*3/MM3 (ref 130–400)
PMV BLD AUTO: 13.1 FL (ref 6–12)
POTASSIUM BLD-SCNC: 6 MMOL/L (ref 3.5–5.1)
PROT SERPL-MCNC: 7.1 G/DL (ref 6.3–8.2)
PROT UR QL STRIP: ABNORMAL
PROTHROMBIN TIME: 70.2 SECONDS (ref 9.3–12.1)
RBC # BLD AUTO: 4.3 10*6/MM3 (ref 4.2–5.4)
RBC # UR: ABNORMAL /HPF
REF LAB TEST METHOD: ABNORMAL
RH BLD: POSITIVE
SMALL PLATELETS BLD QL SMEAR: NORMAL
SODIUM BLD-SCNC: 139 MMOL/L (ref 137–145)
SP GR UR STRIP: >=1.03 (ref 1–1.03)
SQUAMOUS #/AREA URNS HPF: ABNORMAL /HPF
UNIT  ABO: NORMAL
UNIT  RH: NORMAL
UROBILINOGEN UR QL STRIP: ABNORMAL
WBC MORPH BLD: NORMAL
WBC NRBC COR # BLD: 15.12 10*3/MM3 (ref 4.8–10.8)
WBC UR QL AUTO: ABNORMAL /HPF
WHOLE BLOOD HOLD SPECIMEN: NORMAL
WHOLE BLOOD HOLD SPECIMEN: NORMAL

## 2018-01-01 PROCEDURE — 85610 PROTHROMBIN TIME: CPT | Performed by: EMERGENCY MEDICINE

## 2018-01-01 PROCEDURE — 80053 COMPREHEN METABOLIC PANEL: CPT | Performed by: EMERGENCY MEDICINE

## 2018-01-01 PROCEDURE — 96366 THER/PROPH/DIAG IV INF ADDON: CPT

## 2018-01-01 PROCEDURE — 96375 TX/PRO/DX INJ NEW DRUG ADDON: CPT

## 2018-01-01 PROCEDURE — P9017 PLASMA 1 DONOR FRZ W/IN 8 HR: HCPCS

## 2018-01-01 PROCEDURE — 85007 BL SMEAR W/DIFF WBC COUNT: CPT | Performed by: EMERGENCY MEDICINE

## 2018-01-01 PROCEDURE — 71045 X-RAY EXAM CHEST 1 VIEW: CPT

## 2018-01-01 PROCEDURE — 87186 SC STD MICRODIL/AGAR DIL: CPT | Performed by: EMERGENCY MEDICINE

## 2018-01-01 PROCEDURE — 25010000002 CALCIUM GLUCONATE PER 10 ML: Performed by: EMERGENCY MEDICINE

## 2018-01-01 PROCEDURE — 25010000002 VANCOMYCIN PER 500 MG: Performed by: EMERGENCY MEDICINE

## 2018-01-01 PROCEDURE — 25010000002 CEFEPIME PER 500 MG: Performed by: EMERGENCY MEDICINE

## 2018-01-01 PROCEDURE — 99285 EMERGENCY DEPT VISIT HI MDM: CPT

## 2018-01-01 PROCEDURE — 86900 BLOOD TYPING SEROLOGIC ABO: CPT | Performed by: EMERGENCY MEDICINE

## 2018-01-01 PROCEDURE — 70450 CT HEAD/BRAIN W/O DYE: CPT

## 2018-01-01 PROCEDURE — 96365 THER/PROPH/DIAG IV INF INIT: CPT

## 2018-01-01 PROCEDURE — 96368 THER/DIAG CONCURRENT INF: CPT

## 2018-01-01 PROCEDURE — 99309 SBSQ NF CARE MODERATE MDM 30: CPT | Performed by: NURSE PRACTITIONER

## 2018-01-01 PROCEDURE — 63710000001 INSULIN REGULAR HUMAN PER 5 UNITS: Performed by: EMERGENCY MEDICINE

## 2018-01-01 PROCEDURE — 93005 ELECTROCARDIOGRAM TRACING: CPT | Performed by: EMERGENCY MEDICINE

## 2018-01-01 PROCEDURE — 87040 BLOOD CULTURE FOR BACTERIA: CPT | Performed by: EMERGENCY MEDICINE

## 2018-01-01 PROCEDURE — 81001 URINALYSIS AUTO W/SCOPE: CPT | Performed by: EMERGENCY MEDICINE

## 2018-01-01 PROCEDURE — 87106 FUNGI IDENTIFICATION YEAST: CPT | Performed by: EMERGENCY MEDICINE

## 2018-01-01 PROCEDURE — 96367 TX/PROPH/DG ADDL SEQ IV INF: CPT

## 2018-01-01 PROCEDURE — 86901 BLOOD TYPING SEROLOGIC RH(D): CPT | Performed by: EMERGENCY MEDICINE

## 2018-01-01 PROCEDURE — 87086 URINE CULTURE/COLONY COUNT: CPT | Performed by: EMERGENCY MEDICINE

## 2018-01-01 PROCEDURE — 25010000002 AZITHROMYCIN: Performed by: EMERGENCY MEDICINE

## 2018-01-01 PROCEDURE — 87077 CULTURE AEROBIC IDENTIFY: CPT | Performed by: EMERGENCY MEDICINE

## 2018-01-01 PROCEDURE — 36430 TRANSFUSION BLD/BLD COMPNT: CPT

## 2018-01-01 PROCEDURE — 82962 GLUCOSE BLOOD TEST: CPT

## 2018-01-01 PROCEDURE — 85025 COMPLETE CBC W/AUTO DIFF WBC: CPT | Performed by: EMERGENCY MEDICINE

## 2018-01-01 PROCEDURE — 86850 RBC ANTIBODY SCREEN: CPT | Performed by: EMERGENCY MEDICINE

## 2018-01-01 PROCEDURE — 87804 INFLUENZA ASSAY W/OPTIC: CPT | Performed by: EMERGENCY MEDICINE

## 2018-01-01 PROCEDURE — 86927 PLASMA FRESH FROZEN: CPT

## 2018-01-01 RX ORDER — CALCIUM GLUCONATE 94 MG/ML
1 INJECTION, SOLUTION INTRAVENOUS ONCE
Status: COMPLETED | OUTPATIENT
Start: 2018-01-01 | End: 2018-01-01

## 2018-01-01 RX ORDER — SODIUM CHLORIDE 0.9 % (FLUSH) 0.9 %
10 SYRINGE (ML) INJECTION AS NEEDED
Status: DISCONTINUED | OUTPATIENT
Start: 2018-01-01 | End: 2018-01-01 | Stop reason: HOSPADM

## 2018-01-01 RX ORDER — DEXTROSE MONOHYDRATE 25 G/50ML
50 INJECTION, SOLUTION INTRAVENOUS
Status: DISCONTINUED | OUTPATIENT
Start: 2018-01-01 | End: 2018-01-01 | Stop reason: HOSPADM

## 2018-01-01 RX ORDER — SODIUM CHLORIDE 9 MG/ML
INJECTION, SOLUTION INTRAVENOUS
Status: DISCONTINUED
Start: 2018-01-01 | End: 2018-01-01 | Stop reason: HOSPADM

## 2018-01-01 RX ADMIN — SODIUM CHLORIDE 250 ML: 9 INJECTION, SOLUTION INTRAVENOUS at 00:30

## 2018-01-01 RX ADMIN — DEXTROSE MONOHYDRATE 50 ML: 25 INJECTION, SOLUTION INTRAVENOUS at 01:40

## 2018-01-01 RX ADMIN — HUMAN INSULIN 10 UNITS: 100 INJECTION, SOLUTION SUBCUTANEOUS at 01:48

## 2018-01-01 RX ADMIN — CALCIUM GLUCONATE 1 G: 94 INJECTION, SOLUTION INTRAVENOUS at 01:51

## 2018-01-01 RX ADMIN — SODIUM CHLORIDE 500 ML: 9 INJECTION, SOLUTION INTRAVENOUS at 01:57

## 2018-01-01 RX ADMIN — VANCOMYCIN HYDROCHLORIDE 1000 MG: 1 INJECTION, POWDER, LYOPHILIZED, FOR SOLUTION INTRAVENOUS at 02:51

## 2018-01-01 RX ADMIN — NOREPINEPHRINE BITARTRATE 0.02 MCG/KG/MIN: 1 INJECTION INTRAVENOUS at 03:30

## 2018-01-01 RX ADMIN — AZITHROMYCIN 500 MG: 500 INJECTION, POWDER, LYOPHILIZED, FOR SOLUTION INTRAVENOUS at 04:42

## 2018-01-01 RX ADMIN — CEFEPIME HYDROCHLORIDE 1 G: 1 INJECTION, SOLUTION INTRAVENOUS at 02:15

## 2018-01-11 NOTE — ED PROVIDER NOTES
TRIAGE CHIEF COMPLAINT:     Nursing and triage notes reviewed    Chief Complaint   Patient presents with   • Hypotension   • Altered Mental Status      HPI: Linda Snyder is a 72 y.o. female who presents to the emergency department complaining of Altered mental status and decreased blood pressure.  Patient was sent in from a nursing home due to altered mental status.  They state her blood pressure has been running low today.  State patient had been altered with some diaphoresis is different from her baseline.  Patient has a history of end-stage renal disease and gets dialysis but missed it today due to her low blood pressure.  Nursing home is not able to provide much further information, it is unsure patient has been sick for other reasons recently.  On her information packet she did have a recent urinary tract infection mentioned.  Patient is confused and unable to answer questions appropriately.     REVIEW OF SYSTEMS: All other systems reviewed and are negative     PAST MEDICAL HISTORY:   Past Medical History:   Diagnosis Date   • Adenocarcinoma of colon, s/p recent resection 4/17/2017   • Afib    • Anemia    • Anemia 4/17/2017   • Arthritis    • Atrial fibrillation 4/17/2017   • CAD (coronary artery disease)    • Cancer     HX COLON MASS WITH RESECTION   • CHF (congestive heart failure)     DIASTOLIC   • Chronic anticoagulation 4/17/2017    Warfarin for a fib   • Decreased mobility     SON REPORTS MOTHER IS BEDFAST AND REQUIRES ASSISTANCE WITH ALL TRANSFERS   • Diabetes     USES INSULIN, HX OF ALSO BEING ON PO MEDS   • Dialysis patient     [facility] REPORTS DIALYSIS ON MONDAY WEDNESDAY FRIDAY   • ESRD (end stage renal disease) on dialysis 4/17/2017   • Full dentures    • High cholesterol    • HTN (hypertension) 4/17/2017   • Hypertension    • Oxygen dependent    • PONV (postoperative nausea and vomiting)     SON REPORTS MINIMAL   • Renal disease     END STAGE   • Sleep apnea     PATIENT WEARS A BIPAP HS   •  "Uses nebulizer and inhaler at home         FAMILY HISTORY:   Family History   Problem Relation Age of Onset   • Diabetes Mother    • Diabetes Sister    • Diabetes Brother    • Hypertension Other    • Hyperlipidemia Other    • Heart disease Other         SOCIAL HISTORY:   Social History     Social History   • Marital status:      Spouse name: N/A   • Number of children: N/A   • Years of education: N/A     Occupational History   • Not on file.     Social History Main Topics   • Smoking status: Former Smoker     Types: Cigarettes   • Smokeless tobacco: Never Used      Comment: SON REPORTS MINIMAL AND THAT PATIENT SMOKED 30-40- YEARS AGO   • Alcohol use No   • Drug use: No   • Sexual activity: Defer     Other Topics Concern   • Not on file     Social History Narrative        SURGICAL HISTORY:   Past Surgical History:   Procedure Laterality Date   • ABOVE KNEE AMPUTATION Left    • CARDIAC CATHETERIZATION N/A 4/18/2017    Procedure: Left Heart Cath;  Surgeon: Karel Omer MD;  Location: [facility] CATH INVASIVE LOCATION;  Service:    • CARDIAC CATHETERIZATION N/A 4/18/2017    Procedure: Coronary angiography;  Surgeon: Karel Omer MD;  Location: [facility] CATH INVASIVE LOCATION;  Service:    • CARDIAC CATHETERIZATION N/A 4/18/2017    Procedure: Left ventriculography;  Surgeon: Karel Omer MD;  Location: [facility] CATH INVASIVE LOCATION;  Service:    • CARDIAC SURGERY      SON REPORTS HX OF CARDIAC CATH WITH STENT PLACEMENT   • COLON SURGERY      [facility] STAFF REPORTED PATIENT HAD A BOWEL RESECTION APPROXIMATELY 3 WEEKS AGO FOR NECROTIC BOWEL.   • COLONOSCOPY     • CORONARY STENT PLACEMENT      SON REPORTS \"SEVERAL YEARS AGO, NOT SURE HOW MANY STENTS SHE HAS\"   • ENDOSCOPY     • FOOT IRRIGATION, DEBRIDEMENT AND REPAIR Left 2/22/2017    Procedure: Left leg and heel wound debridements with grafting and possible wound VAC application.;  Surgeon: Jnoathan Schulz DPM;  Location: " [facility] OR;  Service:    • FOOT SURGERY Left     3rd, 4th, 5th MT amputation   • HYSTERECTOMY     • OTHER SURGICAL HISTORY      [facility] REPORTS DIALYSIS PORT IN UPPER RIGHT CHEST WALL.   • WISDOM TOOTH EXTRACTION          CURRENT MEDICATIONS:      Medication List      ASK your doctor about these medications          acetaminophen-codeine 300-30 MG per tablet   Commonly known as:  TYLENOL #3       amiodarone 100 MG tablet   Commonly known as:  PACERONE       aspirin 81 MG EC tablet       atorvastatin 10 MG tablet   Commonly known as:  LIPITOR       bisacodyl 5 MG EC tablet   Commonly known as:  DULCOLAX       cefdinir 300 MG capsule   Commonly known as:  OMNICEF   Take 1 capsule by mouth 2 (Two) Times a Day.       clopidogrel 75 MG tablet   Commonly known as:  PLAVIX       docusate sodium 100 MG capsule   Commonly known as:  COLACE       ipratropium-albuterol 0.5-2.5 mg/mL nebulizer   Commonly known as:  DUO-NEB       LEVEMIR 100 UNIT/ML injection   Generic drug:  insulin detemir       lisinopril 5 MG tablet   Commonly known as:  PRINIVIL,ZESTRIL       loperamide 2 MG capsule   Commonly known as:  IMODIUM       metoprolol tartrate 25 MG tablet   Commonly known as:  LOPRESSOR       NOVOLOG 100 UNIT/ML injection   Generic drug:  insulin aspart       NOVOLOG MIX 70/30 (70-30) 100 UNIT/ML injection   Generic drug:  insulin aspart prot-insulin aspart       ondansetron 4 MG tablet   Commonly known as:  ZOFRAN       pitavastatin calcium 1 MG tablet tablet   Commonly known as:  LIVALO       polyethylene glycol powder   Commonly known as:  MIRALAX       PROBIOTIC FORMULA capsule       PROSTAT PO       AMBIKA-ANTOINE tablet       saccharomyces boulardii 250 MG capsule   Commonly known as:  FLORASTOR       SANTYL 250 UNIT/GM ointment   Generic drug:  collagenase       temazepam 7.5 MG capsule   Commonly known as:  RESTORIL       TRUE METRIX BLOOD GLUCOSE TEST test strip   Generic drug:  glucose blood       vitamin C  500 MG tablet   Commonly known as:  ASCORBIC ACID       warfarin 4 MG tablet   Commonly known as:  COUMADIN       Zinc Sulfate 220 (50 Zn) MG tablet            ALLERGIES: Floraquin [iodoquinol]; Niacin and related; and Ciprofloxacin hcl     PHYSICAL EXAM:   VITAL SIGNS:   Vitals:    01/10/18 2307   BP: 91/58   Pulse: 87   Resp: 22   Temp: 97.3 °F (36.3 °C)   SpO2: 97%      CONSTITUTIONAL: Awake, Confused, will attempt to answer questions, diaphoretic   HENT: Atraumatic, normocephalic, oral mucosa pink and moist, airway patent.   EYES: Conjunctiva clear   NECK: Trachea midline, non-tender, supple   CARDIOVASCULAR: An irregularly irregular rhythm is noted, No murmurs, rubs, gallops   PULMONARY/CHEST: There are coarse lung sounds throughout, good air movement.  ABDOMINAL: Non-distended, soft, non-tender - no rebound or guarding.  NEUROLOGIC: Non-focal, moving all four extremities, no gross sensory or motor deficits.   EXTREMITIES: No clubbing, cyanosis, or edema   SKIN: Warm, Dry, No erythema, No rash     ED COURSE / MEDICAL DECISION MAKING:   Linda Snyder is a 72 y.o. female who presents to the emergency department for evaluation of altered mental status.  Patient is confused on arrival in the emergency department, presumably patient is not at her baseline.  We will attempt to answer some questions but patient doesn't make much sense when speaking.  A wide workup was undertaken given patient's blood pressure being tenuous on arrival.  Initially EMS states it was in the 80s or 90s systolic, initially in the 90s systolic on arrival and then improves to the low 100s and back to the 90s.  Did improve with small bolus of IV fluids.  A lactic acid was not ordered although patient did meet sepsis screen, get being a dialysis patient I assumed patient's lactic would be elevated regardless and patient clinically was septic and this was recognized on arrival.  Likewise excessive fluids were limited due to patients  fluid status. Patient also had an elevated potassium of 6.0.  Patient was given insulin, dextrose, calcium for this finding.  Patient's urine also revealed a significant urinary tract infection which is likely contributing to her symptoms.  She was started on broad-spectrum antibiotics.  Influenza screen was also positive.  Patient's EKG revealed atrial fibrillation with a rate of 95 bpm.  There were some inverted T waves in slight ST depression and some of the anterior leads.  Patient's chest x-ray reveals some signs of pulmonary edema.  Patient also had an INR that was significantly elevated at 6.14.  Because of this, it was not felt to be safe to place a central line.  Full peripheral IV lines blue and started small amounts of bleeding.  Patient had a dialysis catheter in the right side of her chest, this was accessed and use as an additional central line port after consultation the nephrologist who stated this would be appropriate to use.  We had to place the patient on Levothroid due to decreasing blood pressure.  I was not able to give patient the 30 ML per kilogram sepsis protocol fluid bolus because after even gentle hydration patient began to show some signs of fluid overload.  She also had signs of some developing pulmonary edema on examination.  For this reason vasopressors were chosen over continued fluids as we had no way to dialyze patient at this point in time.  I did speak to the patient's son as did our nurse and they wanted patient to remain a full code and intubation or invasive procedures if necessary.  Son also made aware of the dangers of IV vasopressors as well.  There were no beds at this facility and so attempted to contact multiple other facilities to try and find an appropriate bed placement for the patient.  I spoke to the [facility] at 2:53 AM, they stated they were on divert and could not accept critical patients.  Attempted to contact other hospitals in [place], they did  not have any ICU beds.  Spoke to position a [facility] in [place] and they stated they did not have ICU beds.  Spoke to multiple other hospitals also did not have beds, spoke to a physician at [facility] who did not feel that they couldn't adequately take care of patient and so declined to accept in transfer.  Multiple hospitals in [place] were attempted but did not have any beds available.  I was able to speak to a Dr. Morataya at the [facility] who agreed to accept the patient to critical care.  The patient's mental status did begin to improve in the emergency department, blood pressure began to improve with vasopressors.  I also ordered FFP to try and help reverse patient's elevated INR so that further central line access could be attempted.  Plan is to transport the patient to the [facility] for further treatment.    DECISION TO DISCHARGE/ADMIT: see ED care timeline     FINAL IMPRESSION:   1 -- sepsis secondary to urinary tract infection and influenza  2 -- influenza  3 -- supratherapeutic INR  4 -- hyperkalemia  5 -- altered mental status    Electronically signed by: Fartun Weber MD, 1/10/2018 11:38 PM       Fartun Weber MD  01/11/18 0613

## 2018-01-11 NOTE — ED NOTES
Dr Weber spoke with Dr Chicas who states we may access dialysis catheter as pt has a fistula and Dr Chicas planned on pulling dialysis catheter this week. Simran [place] supervisor contacted dialysis Rn on call and verified process for accessing dialysis catheter. Sterile technique used. 10ml wasted from dialysis catheter, then flushed with 10ml of NS. Levophed moved to dialysis catheter for infusion. Lab obtained type and screen. States it will be approx. 1 hour for FFP to thaw. MD made aware.      Sheela Wright RN  01/11/18 0430

## 2018-01-11 NOTE — ED NOTES
Contacted [facility] per Dr. Weber to speak with the ED Physician. Call transferred @ this time. Dr. Weber talking with ED physician.      Jesús Pierre  01/11/18 0252

## 2018-01-11 NOTE — ED NOTES
[facility] contacted for Dr. Weber for possible transfer.      Jesús Pierre  01/11/18 8059

## 2018-01-11 NOTE — ED NOTES
Report to Nallely AMIN @ [facility] ICU. I also called and spoke with pts son Cr who is aware of transfer and is agreeable. He states he will notify other family members. [facility] notified.      Sheela Wright RN  01/11/18 0516

## 2018-01-11 NOTE — ED NOTES
[facility]. Called for Dr. Weber. Stated that they will call back with Hospitalist.      Jesús Pierre  01/11/18 0439       Jesús Pierre  01/11/18 0443

## 2018-01-11 NOTE — ED NOTES
Call sent to Dr. Weber @ this time. He is talking with Hospitalist @ [facility].      Jesús Pierre  01/11/18 0330       Jesús Pierre  01/11/18 0331

## 2018-01-11 NOTE — ED NOTES
[facility] called per Dr. Weber. Stated they would page Dr. Chicas and that he would call back.      Jesús Pierre  01/11/18 0419

## 2018-01-11 NOTE — ED NOTES
"RT @ [facility] for ABG. Pt is able to open eyes when I say her name. I asked if she hurt anywhere she states \"im just sleepy\"     Sheela Wright RN  01/11/18 0001    "

## 2018-01-11 NOTE — ED NOTES
[facility] called back and asked if we could do a fixed wing, I stated that we could. Currently checking the weather to see if possible. They will call back with answer.      Jesús Pierre  01/11/18 0709

## 2018-01-11 NOTE — ED NOTES
Unable to scan FFP, states unit number does not match. Contacted lab who states all appropriate procedures were followed by them and to document FFP on paper. FFP infusion initiated after confirming unit, pt, expiration, product code with Isaiah Wright RN  01/11/18 0704

## 2018-01-11 NOTE — ED NOTES
MD aware of VS. PT is still responsive, answers questions and follows commands     Sheela Wright, RN  01/11/18 9631

## 2018-01-11 NOTE — ED NOTES
[facility]. Called back with Hospitalist. Call transferred to Dr. Weber.      Jesús Pierre  01/11/18 4898

## 2018-01-11 NOTE — ED NOTES
[facility] called per Dr. Weber.  is currently doing weather check.      Jesús JILLIAN Pierre  01/11/18 0752

## 2018-01-11 NOTE — ED NOTES
[facility] contacted, states they do not have any available resources to transport pt today. [facility] also contacted, states they will not fly due to the weather.       Sheela Wright RN  01/11/18 7487

## 2018-01-11 NOTE — ED NOTES
[facility] called @ this time for their bed status. No ICU beds available.     Jesús Pierre  01/11/18 0416

## 2018-01-11 NOTE — ED NOTES
Pt is able to wake up and tell me her name, birthday and that she is in the hospital. I updated the pt on POC. She is currently diaphoretic.      Sheela Wright RN  01/11/18 0642

## 2018-01-11 NOTE — ED NOTES
[facility] states they are unable to fly pt due to winds being too high. I contacted [facility] requesting transport, they state they will not transport a pt to [facility].     Sheela Wright RN  01/11/18 0523

## 2018-01-11 NOTE — ED NOTES
Transferred call to Dr. Weber. Dr. Zhu is currently speaking with him.      Jesús Pierre  01/11/18 0354

## 2018-01-11 NOTE — ED NOTES
Contacted [facility] for transport. Will call back w/ ETA.      Abbey Montoya  01/11/18 5362

## 2018-01-11 NOTE — ED NOTES
[facility] contacted to see if they had any ICU beds available. House Supervisor states that they have no beds available.      Jesús Pierre  01/11/18 4929

## 2018-01-11 NOTE — ED NOTES
"Pt awakes to verbal stimuli. She states \"i feel a little better\"     Sheela Wright RN  01/11/18 6501    "

## 2018-01-11 NOTE — ED NOTES
Pt c/o pain at left chest PIV site. D/C'd zithroomax IV at this time. MD made aware.     Sheela Wright RN  01/11/18 0524

## 2018-01-11 NOTE — ED NOTES
[facility] contacted, they state that they are currently pending on beds.      Jesús Pierre  01/11/18 0332

## 2018-01-11 NOTE — ED NOTES
[facility] called per Dr. Weber. Stated they will call us back.      Jesús Pierre  01/11/18 0426

## 2018-01-11 NOTE — ED NOTES
I called and spoke with pt's son, Cr. He states pt does not have a POA and she makes all of her own decisions. Son, Cr, states he gives us permission to give FFP.      Sheela Wright RN  01/11/18 0359

## 2018-01-11 NOTE — ED NOTES
I called and spoke with pt's son, Cr. Updated on pt's status. HE states he wants all possible care, including central line placement and intubation if necessary.      Sheela Wright RN  01/11/18 5601

## 2018-10-13 ENCOUNTER — EPISODE CHANGES (OUTPATIENT)
Dept: SOCIAL WORK | Facility: HOSPITAL | Age: 73
End: 2018-10-13

## 2022-08-07 NOTE — ED PROVIDER NOTES
Subjective   HPI Comments: Patient is 71-year-old female who is here today sent by nursing home secondary to necrotic left foot wound.  Patient has been unable to get definitive treatment secondary to her cardiac status.  Patient tells me she is apparently on her way to get debridement of this wound surgically approximately 3-4 weeks ago when she actually had a heart attack.  Patient states she was sent by ambulance at that time to the [facility].  Nursing facility has been doing local wound care to her wound.  I actually spoke with Dr. Schulz who is a podiatrist via telephone in regards to this patient who states that secondary to her advanced calcaneal osteomyelitis and poor vascular status he would be only able to offer her an amputation, but secondary to her cardiac status he cannot find anyone to perform anesthesia.  Patient complains that the foot is extremely tender to palpation in and around the wound, and that it stinks very badly.  States it is been present for approximately one month, but Dr. Schulz states this wound has been present and chronic for at least several months.      History provided by:  Patient      Review of Systems   Skin:        Multiple other pressure ulcers, buttocks.  Necrotic left heel wound   All other systems reviewed and are negative.      Past Medical History:   Diagnosis Date   • Adenocarcinoma of colon, s/p recent resection 4/17/2017   • Afib    • Anemia    • Anemia 4/17/2017   • Arthritis    • Atrial fibrillation 4/17/2017   • CAD (coronary artery disease)    • Cancer     HX COLON MASS WITH RESECTION   • CHF (congestive heart failure)     DIASTOLIC   • Chronic anticoagulation 4/17/2017    Warfarin for a fib   • Decreased mobility     [facility] REPORTS MOTHER IS BEDFAST AND REQUIRES ASSISTANCE WITH ALL TRANSFERS   • Diabetes     USES INSULIN, HX OF ALSO BEING ON PO MEDS   • Dialysis patient     [facility] REPOR[facility] DIALYSIS ON MONDAY WEDNESDAY FRIDAY   • ESRD (end stage renal disease)  "on dialysis 4/17/2017   • Full dentures    • High cholesterol    • HTN (hypertension) 4/17/2017   • Hypertension    • Oxygen dependent    • PONV (postoperative nausea and vomiting)     SON REPORTS MINIMAL   • Renal disease     END STAGE   • Sleep apnea     PATIENT WEARS A BIPAP HS   • Uses nebulizer and inhaler at home        Allergies   Allergen Reactions   • Floraquin [Iodoquinol] Itching   • Niacin And Related Itching   • Ciprofloxacin Hcl Other (See Comments)       Past Surgical History:   Procedure Laterality Date   • CARDIAC CATHETERIZATION N/A 4/18/2017    Procedure: Left Heart Cath;  Surgeon: Karel Omer MD;  Location: [facility] CATH INVASIVE LOCATION;  Service:    • CARDIAC CATHETERIZATION N/A 4/18/2017    Procedure: Coronary angiography;  Surgeon: Karel Omer MD;  Location: [facility] CATH INVASIVE LOCATION;  Service:    • CARDIAC CATHETERIZATION N/A 4/18/2017    Procedure: Left ventriculography;  Surgeon: Karel Omer MD;  Location: [facility] CATH INVASIVE LOCATION;  Service:    • CARDIAC SURGERY      [facility] REPORTS HX OF CARDIAC CATH WITH STENT PLACEMENT   • COLON SURGERY      [facility] PATIENT HAD A BOWEL RESECTION APPROXIMATELY 3 WEEKS AGO FOR NECROTIC BOWEL.   • COLONOSCOPY     • CORONARY STENT PLACEMENT      SON REPORTS \"SEVERAL YEARS AGO, NOT SURE HOW MANY STENTS SHE HAS\"   • ENDOSCOPY     • FOOT IRRIGATION, DEBRIDEMENT AND REPAIR Left 2/22/2017    Procedure: Left leg and heel wound debridements with grafting and possible wound VAC application.;  Surgeon: Jonathan Schulz DPM;  Location: [facility];  Service:    • FOOT SURGERY Left     3rd, 4th, 5th MT amputation   • HYSTERECTOMY     • OTHER SURGICAL HISTORY      [facility] REPORTS DIALYSIS PORT IN UPPER RIGHT CHEST WALL.   • WISDOM TOOTH EXTRACTION         Family History   Problem Relation Age of Onset   • Diabetes Mother    • Diabetes Sister    • Diabetes Brother    • Hypertension " Other    • Hyperlipidemia Other    • Heart disease Other        Social History     Social History   • Marital status:      Spouse name: N/A   • Number of children: N/A   • Years of education: N/A     Social History Main Topics   • Smoking status: Former Smoker     Types: Cigarettes   • Smokeless tobacco: Never Used      Comment: SON REPORTS MINIMAL AND THAT PATIENT SMOKED 30-40- YEARS AGO   • Alcohol use No   • Drug use: No   • Sexual activity: Defer     Other Topics Concern   • None     Social History Narrative           Objective   Physical Exam   Constitutional: She is oriented to person, place, and time. She appears well-developed and well-nourished.   HENT:   Head: Normocephalic and atraumatic.   Cardiovascular: Normal rate and regular rhythm.    No dopplerable dorsalis pedis or posterior tib left foot   Pulmonary/Chest: Effort normal and breath sounds normal.   Abdominal: Soft.   Musculoskeletal:   Atrophic bilateral lower extremities, there are multiple toes missing on the left foot.  There is a large necrotic posterior heel wound, with foul odor and exposed bony material   Neurological: She is alert and oriented to person, place, and time.   Skin:    multiple pressure ulcers buttocks   Psychiatric: She has a normal mood and affect. Her behavior is normal.   Vitals reviewed.      Procedures         ED Course  ED Course      Patient was stable throughout the emergency room course.  Patient's heel wound is necrotic, has felt odor.  I have spoken with Dr. Schulz who is her podiatrist who had planned to do debridement and possible amputation in very to per his report extensive calcaneal osteomyelitis, but he states that she has become too complicated for him as no one here we'll put her under anesthesia secondary to her poor cardiovascular status.  It was his recommendation that patient be seen at the [facility] where her cardiologist is, also vascular surgery.  As I entered to find pulses in the  left foot I was unable.  As patient is on dialysis I will defer the CT angios bilateral lower extremities with runoff to the [facility].  I've contacted Dr. Villa at the [facility] ER who is accepted her in anticipation of of a vascular consult secondary to cold foot.            MDM    Final diagnoses:   Cold left foot   Open wound of left foot except toes with complication, initial encounter            Leigh Ann Moya PA-C  06/27/17 5951     I have reviewed and confirmed nurses' notes...

## (undated) DEVICE — DRSNG WND GZ CURAD OIL EMULSION 3X3IN STRL

## (undated) DEVICE — SUT ETHLN 3/0 FS1 663G

## (undated) DEVICE — GLV SURG SENSICARE W/ALOE PF LF 8 STRL

## (undated) DEVICE — GLV SURG BIOGEL SENSR LTX PF SZ7.5

## (undated) DEVICE — ANGIOGRAPHIC CATHETER: Brand: EXPO™

## (undated) DEVICE — RADIFOCUS GLIDEWIRE ADVANTAGE GUIDEWIRE: Brand: GLIDEWIRE ADVANTAGE

## (undated) DEVICE — SMARTGOWN SURGICAL GOWN, 2XL: Brand: CONVERTORS

## (undated) DEVICE — CATH F6 ST AL I 100CM: Brand: SUPERTORQUE

## (undated) DEVICE — 1000 S-DRAPE TOWEL DRAPE 10/BX: Brand: STERI-DRAPE™

## (undated) DEVICE — GLIDESHEATH ACCESS KIT HYDROPHILIC COATED INTRODUCER SHEATH: Brand: GLIDESHEATH

## (undated) DEVICE — NON-ADHERENT STRIPS,OIL EMULSION: Brand: CURITY

## (undated) DEVICE — RADIFOCUS OPTITORQUE ANGIOGRAPHIC CATHETER: Brand: OPTITORQUE

## (undated) DEVICE — BANDAGE,GAUZE,BULKEE II,4.5"X4.1YD,STRL: Brand: MEDLINE

## (undated) DEVICE — CATH F6 ST IM 100CM: Brand: SUPERTORQUE

## (undated) DEVICE — ST IRR CYSTO W/SPK 77IN LF

## (undated) DEVICE — ELECTRD PAD DEFIB A/

## (undated) DEVICE — TR BAND RADIAL ARTERY COMPRESSION DEVICE: Brand: TR BAND

## (undated) DEVICE — GLV SURG SENSICARE GREEN W/ALOE PF LF 8 STRL

## (undated) DEVICE — PK EXTRM LOWR 20

## (undated) DEVICE — SPNG GZ WOVN 4X4IN 12PLY 10/BX STRL

## (undated) DEVICE — GW EMR FIX EXCHG J STD .035 3MM 260CM

## (undated) DEVICE — CATH F6 ST JR 4 100CM: Brand: SUPERTORQUE